# Patient Record
Sex: MALE | Race: WHITE | NOT HISPANIC OR LATINO | Employment: FULL TIME | URBAN - METROPOLITAN AREA
[De-identification: names, ages, dates, MRNs, and addresses within clinical notes are randomized per-mention and may not be internally consistent; named-entity substitution may affect disease eponyms.]

---

## 2023-11-09 ENCOUNTER — OFFICE VISIT (OUTPATIENT)
Dept: INTERNAL MEDICINE | Facility: CLINIC | Age: 38
End: 2023-11-09
Payer: COMMERCIAL

## 2023-11-09 VITALS
WEIGHT: 279 LBS | DIASTOLIC BLOOD PRESSURE: 76 MMHG | RESPIRATION RATE: 18 BRPM | TEMPERATURE: 96.6 F | OXYGEN SATURATION: 98 % | HEART RATE: 103 BPM | SYSTOLIC BLOOD PRESSURE: 124 MMHG | HEIGHT: 70 IN | BODY MASS INDEX: 39.94 KG/M2

## 2023-11-09 DIAGNOSIS — Z79.899 LONG TERM USE OF DRUG: ICD-10-CM

## 2023-11-09 DIAGNOSIS — R53.83 OTHER FATIGUE: ICD-10-CM

## 2023-11-09 DIAGNOSIS — Z23 IMMUNIZATION DUE: ICD-10-CM

## 2023-11-09 DIAGNOSIS — Z13.220 LIPID SCREENING: ICD-10-CM

## 2023-11-09 DIAGNOSIS — F90.0 ATTENTION DEFICIT HYPERACTIVITY DISORDER (ADHD), PREDOMINANTLY INATTENTIVE TYPE: Primary | ICD-10-CM

## 2023-11-09 DIAGNOSIS — J45.20 MILD INTERMITTENT ASTHMA, UNSPECIFIED WHETHER COMPLICATED: ICD-10-CM

## 2023-11-09 DIAGNOSIS — Z23 INFLUENZA VACCINE NEEDED: ICD-10-CM

## 2023-11-09 DIAGNOSIS — E55.9 VITAMIN D DEFICIENCY: ICD-10-CM

## 2023-11-09 DIAGNOSIS — R35.0 URINARY FREQUENCY: ICD-10-CM

## 2023-11-09 DIAGNOSIS — F33.8 OTHER RECURRENT DEPRESSIVE DISORDERS: ICD-10-CM

## 2023-11-09 DIAGNOSIS — F41.1 GAD (GENERALIZED ANXIETY DISORDER): ICD-10-CM

## 2023-11-09 DIAGNOSIS — R73.09 ELEVATED GLUCOSE: ICD-10-CM

## 2023-11-09 LAB
BILIRUB BLD-MCNC: NEGATIVE MG/DL
CLARITY, POC: CLEAR
COLOR UR: NORMAL
EXPIRATION DATE: NORMAL
GLUCOSE UR STRIP-MCNC: NEGATIVE MG/DL
KETONES UR QL: NEGATIVE
LEUKOCYTE EST, POC: NEGATIVE
Lab: NORMAL
NITRITE UR-MCNC: NEGATIVE MG/ML
PH UR: 7 [PH] (ref 5–8)
PROT UR STRIP-MCNC: NEGATIVE MG/DL
RBC # UR STRIP: NEGATIVE /UL
SP GR UR: 1.01 (ref 1–1.03)
UROBILINOGEN UR QL: NORMAL

## 2023-11-09 RX ORDER — ESOMEPRAZOLE MAGNESIUM 20 MG/1
20 FOR SUSPENSION ORAL
COMMUNITY

## 2023-11-09 RX ORDER — FLUTICASONE PROPIONATE 50 MCG
2 SPRAY, SUSPENSION (ML) NASAL DAILY
COMMUNITY

## 2023-11-09 RX ORDER — DEXTROAMPHETAMINE SACCHARATE, AMPHETAMINE ASPARTATE, DEXTROAMPHETAMINE SULFATE, AND AMPHETAMINE SULFATE 7.5; 7.5; 7.5; 7.5 MG/1; MG/1; MG/1; MG/1
60 TABLET ORAL 2 TIMES DAILY
COMMUNITY
End: 2023-11-09 | Stop reason: SDUPTHER

## 2023-11-09 RX ORDER — ESOMEPRAZOLE MAGNESIUM 20 MG/1
20 FOR SUSPENSION ORAL
Status: CANCELLED | OUTPATIENT
Start: 2023-11-09

## 2023-11-09 RX ORDER — MEMANTINE HYDROCHLORIDE 10 MG/1
10 TABLET ORAL DAILY
COMMUNITY
End: 2023-11-09 | Stop reason: SDUPTHER

## 2023-11-09 RX ORDER — MEMANTINE HYDROCHLORIDE 10 MG/1
10 TABLET ORAL DAILY
Qty: 30 TABLET | Refills: 5 | Status: SHIPPED | OUTPATIENT
Start: 2023-11-09

## 2023-11-09 RX ORDER — DEXTROAMPHETAMINE SACCHARATE, AMPHETAMINE ASPARTATE, DEXTROAMPHETAMINE SULFATE, AND AMPHETAMINE SULFATE 7.5; 7.5; 7.5; 7.5 MG/1; MG/1; MG/1; MG/1
60 TABLET ORAL 2 TIMES DAILY
Qty: 120 TABLET | Refills: 0 | Status: SHIPPED | OUTPATIENT
Start: 2023-11-09 | End: 2023-11-20 | Stop reason: SDUPTHER

## 2023-11-09 RX ORDER — FLUTICASONE PROPIONATE 50 MCG
2 SPRAY, SUSPENSION (ML) NASAL DAILY
Status: CANCELLED | OUTPATIENT
Start: 2023-11-09

## 2023-11-09 NOTE — PROGRESS NOTES
"    Office Note     Name: Bobo Bolden    : 1985     MRN: [id number]     Chief Complaint  Establish Care and ADHD (Needs medications refilled today )    Subjective     History of Present Illness:  Bobo Bolden is a 38 y.o. male who presents today for establishing care.  He states he has problems with ADHD and anxiety and depression and asthma and allergies and these are all stable with his current medication and he needs refills.  He states he was placed on Namenda as an off label use for ADHD and it does seem to help and this was by psychiatrist and he has done well with it.  He occasionally has urinary frequency and some other issues as well.  He would like to see a urologist.  He has not had labs in quite some time.  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    Review of Systems   Respiratory:  Negative for cough, shortness of breath and wheezing.    Cardiovascular:  Negative for chest pain and palpitations.   Gastrointestinal:  Negative for blood in stool, diarrhea and vomiting.   Genitourinary:  Negative for dysuria, flank pain and genital sores.       Objective     Vital Signs  /76   Pulse 103   Temp 96.6 °F (35.9 °C) (Infrared)   Resp 18   Ht 178.7 cm (70.35\")   Wt 127 kg (279 lb)   SpO2 98%   BMI 39.63 kg/m²   Estimated body mass index is 39.63 kg/m² as calculated from the following:    Height as of this encounter: 178.7 cm (70.35\").    Weight as of this encounter: 127 kg (279 lb).          Physical Exam  Vitals and nursing note reviewed.   HENT:      Head: Normocephalic and atraumatic.   Neck:      Vascular: No carotid bruit.   Cardiovascular:      Rate and Rhythm: Normal rate and regular rhythm.      Heart sounds: Normal heart sounds. No murmur heard.     No gallop.   Pulmonary:      Effort: Pulmonary effort is " normal. No respiratory distress.      Breath sounds: No stridor. No wheezing, rhonchi or rales.   Musculoskeletal:      Cervical back: Normal range of motion and neck supple.      Right lower leg: No edema.      Left lower leg: No edema.   Lymphadenopathy:      Cervical: No cervical adenopathy.   Neurological:      Mental Status: He is alert.                 Assessment and Plan     Diagnoses and all orders for this visit:    1. Attention deficit hyperactivity disorder (ADHD), predominantly inattentive type (Primary)  -     Adderall 30 MG tablet; Take 2 tablets by mouth 2 (Two) Times a Day.  Dispense: 120 tablet; Refill: 0  -     memantine (NAMENDA) 10 MG tablet; Take 1 tablet by mouth Daily.  Dispense: 30 tablet; Refill: 5  -     Compliance Drug Analysis, Ur - Urine, Clean Catch; Future    2. Influenza vaccine needed  -     Fluzone >6 Months ([id number])    3. Immunization due    4. Mild intermittent asthma, unspecified whether complicated  -     albuterol (PROAIR RESPICLICK) 108 (90 Base) MCG/ACT inhaler; Inhale 2 puffs Every 4 (Four) Hours As Needed for Wheezing.  Dispense: 1 each; Refill: 5    5. VLADIMIR (generalized anxiety disorder)  -     Effexor  MG 24 hr capsule; Take 1 capsule by mouth 2 (Two) Times a Day.  Dispense: 60 capsule; Refill: 4    6. Other recurrent depressive disorders  -     Effexor  MG 24 hr capsule; Take 1 capsule by mouth 2 (Two) Times a Day.  Dispense: 60 capsule; Refill: 4    7. Urinary frequency  -     POCT urinalysis dipstick, automated  -     Ambulatory Referral to Urology    8. Long term use of drug  -     Compliance Drug Analysis, Ur - Urine, Clean Catch; Future    9. Vitamin D deficiency  -     Vitamin D,25-Hydroxy; Future    10. Elevated glucose  -     Hemoglobin A1c; Future    11. Other fatigue  -     Comprehensive Metabolic Panel; Future  -     CBC Auto Differential; Future  -     TSH Rfx On Abnormal To Free T4; Future    12. Lipid screening  -     Lipid Panel;  Future          Follow Up  Return in about 3 months (around 2/9/2024) for Recheck.    Yohana Herman, DO

## 2023-11-10 ENCOUNTER — TELEPHONE (OUTPATIENT)
Dept: INTERNAL MEDICINE | Facility: CLINIC | Age: 38
End: 2023-11-10
Payer: COMMERCIAL

## 2023-11-10 DIAGNOSIS — F90.0 ATTENTION DEFICIT HYPERACTIVITY DISORDER (ADHD), PREDOMINANTLY INATTENTIVE TYPE: ICD-10-CM

## 2023-11-10 NOTE — LETTER
November 17, 2023    Bobo Kimi  [address] [facility] [address]      Mr. Bolden,       We have been trying to reach you regarding a prior authorization for your medication. Please call the office at your earliest convenience.                           Yohana Herman, DO

## 2023-11-10 NOTE — TELEPHONE ENCOUNTER
Pharmacy Name: [facility] - [address] - [phone] [facility] [phone]          Pharmacy representative phone number: [phone]    What medication are you calling in regards to: ADDERAL     What question does the pharmacy have: THE CALLER STATES THAT THE INSURANCE WILL NOT COVER THE NAME BRAND MEDICATION AND INSURANCE WILL NOT COVER THE 4 TABLETS A DAY FOR THE MEDICATION AS THE GENERIC THE CALLER STATES THAT THE PATIENT WILL NEED A PRIOR AUTHORIZATION     Who is the provider that prescribed the medication: DOCTOR MCCORD

## 2023-11-13 NOTE — TELEPHONE ENCOUNTER
"Received determination today:     Denied    Denied. Coverage is provided when the prescribed medication is used for a Food and Drug Administration (FDA) approved quantity and dose.     THE INFORMATION CONTAINED IN THE REMAINING PART OF THIS SECTION IS INTENDED FOR YOUR PROVIDER AND GIVES A MORE DETAILED DESCRIPTION FOR WHY THIS REQUEST WAS NOT APPROVED. Requests for Off-Label uses of a drug will be considered for approval according to the following criteria: I. Documentation must be submitted showing the member has tried and failed the existing FDA approved and/or clinical guideline recommended therapies unless contraindicated or not tolerated; AND II. The prescribed use must be supported by one or more of the following: a. Narrative information from [facility] Formulary Service Drug Information (AHFS) or Clinical Pharmacology b. Lexicomp: Evidence level A c. Micromedex: Recommendation class I, IIa, or IIb d. Evidence from at least two published studies from major scientific or medical peer reviewed journals demonstrates safety and efficacy for the specified condition in a comparable population (for example, age group or level of disease severity). Standards Used in Making the Decision include: Sections 6 and 7 in your Evidence of Coverage (\"Prescription Drugs\" and \"What is Not Covered\"), the [facility] Policy for Medical Necessity Off-Label, and Coverage Review Pharmacy Criteria and Policy.      Please advise.     "

## 2023-11-13 NOTE — TELEPHONE ENCOUNTER
Please let him know and ask if he would like a behavioral medicine referral to see if they can get it approved?

## 2023-11-16 LAB — DRUGS UR: NORMAL

## 2023-11-20 DIAGNOSIS — F90.0 ATTENTION DEFICIT HYPERACTIVITY DISORDER (ADHD), PREDOMINANTLY INATTENTIVE TYPE: ICD-10-CM

## 2023-11-20 RX ORDER — DEXTROAMPHETAMINE SACCHARATE, AMPHETAMINE ASPARTATE, DEXTROAMPHETAMINE SULFATE, AND AMPHETAMINE SULFATE 7.5; 7.5; 7.5; 7.5 MG/1; MG/1; MG/1; MG/1
60 TABLET ORAL 2 TIMES DAILY
Qty: 120 TABLET | Refills: 0 | Status: SHIPPED | OUTPATIENT
Start: 2023-11-20

## 2023-11-20 RX ORDER — DEXTROAMPHETAMINE SACCHARATE, AMPHETAMINE ASPARTATE, DEXTROAMPHETAMINE SULFATE, AND AMPHETAMINE SULFATE 7.5; 7.5; 7.5; 7.5 MG/1; MG/1; MG/1; MG/1
60 TABLET ORAL 2 TIMES DAILY
Qty: 120 TABLET | Refills: 0 | Status: CANCELLED | OUTPATIENT
Start: 2023-11-20

## 2023-11-20 NOTE — TELEPHONE ENCOUNTER
Called and spoke with patient, he is aware that insurance will not pay for adderall. He would like new prescription sent to [facility] [address] so he can use a GoodRx card and get this filled. He is also interested in a behavioral health referral at this time.

## 2023-11-28 ENCOUNTER — TELEPHONE (OUTPATIENT)
Dept: INTERNAL MEDICINE | Facility: CLINIC | Age: 38
End: 2023-11-28
Payer: COMMERCIAL

## 2023-11-28 DIAGNOSIS — F90.0 ATTENTION DEFICIT HYPERACTIVITY DISORDER (ADHD), PREDOMINANTLY INATTENTIVE TYPE: Primary | ICD-10-CM

## 2023-11-28 NOTE — TELEPHONE ENCOUNTER
Hub staff attempted to follow warm transfer process and was unsuccessful     Caller: Bobo Bolden    Relationship to patient: Self    Best call back number: [phone]    INSURANCE WILL NOT PAY FOR ADDERALL AT THIS DOSAGE; REQUESTING THE GENERIC MEDICATION TO BE CALLED TO     [facility] [place], [place] - [address] [facility] [address] - [phone]  - [phone] FX

## 2023-11-28 NOTE — TELEPHONE ENCOUNTER
Patient called and stated he is aware he will pay out of pocket for the dose needed. Patient is fine with Behavioral Health taking this over once he gets established. Patient is requesting RX for the generic of Adderal so that his out of pocket cost is a little cheaper

## 2023-11-28 NOTE — TELEPHONE ENCOUNTER
It is the dosage that is the problem , not the medication.--He has been given a behavioral health referral to see if they can get it covered

## 2023-11-29 DIAGNOSIS — F90.0 ATTENTION DEFICIT HYPERACTIVITY DISORDER (ADHD), PREDOMINANTLY INATTENTIVE TYPE: ICD-10-CM

## 2023-11-29 DIAGNOSIS — F90.0 ATTENTION DEFICIT HYPERACTIVITY DISORDER (ADHD), PREDOMINANTLY INATTENTIVE TYPE: Primary | ICD-10-CM

## 2023-11-29 RX ORDER — DEXTROAMPHETAMINE SACCHARATE, AMPHETAMINE ASPARTATE, DEXTROAMPHETAMINE SULFATE, AND AMPHETAMINE SULFATE 7.5; 7.5; 7.5; 7.5 MG/1; MG/1; MG/1; MG/1
60 TABLET ORAL 2 TIMES DAILY
Qty: 120 TABLET | Refills: 0 | Status: CANCELLED | OUTPATIENT
Start: 2023-11-29

## 2023-11-29 RX ORDER — DEXTROAMPHETAMINE SACCHARATE, AMPHETAMINE ASPARTATE, DEXTROAMPHETAMINE SULFATE AND AMPHETAMINE SULFATE 7.5; 7.5; 7.5; 7.5 MG/1; MG/1; MG/1; MG/1
60 TABLET ORAL 2 TIMES DAILY
Qty: 120 TABLET | Refills: 0 | Status: SHIPPED | OUTPATIENT
Start: 2023-11-29

## 2023-11-29 NOTE — TELEPHONE ENCOUNTER
Caller: ChristopherBobo chaudhari    Relationship: Self    Best call back number:      Requested Prescriptions:   Requested Prescriptions     Pending Prescriptions Disp Refills    Adderall 30 MG tablet 120 tablet 0     Sig: Take 2 tablets by mouth 2 (Two) Times a Day.        Pharmacy where request should be sent: [id number] [facility] [address] [facility] [address] - [phone]  - [phone] FX     Last office visit with prescribing clinician: 11/9/2023   Last telemedicine visit with prescribing clinician: Visit date not found   Next office visit with prescribing clinician: 2/7/2024     Additional details provided by patient: PATIENT IS REQUESTING GENERIC AS THE BRAND IS TOO EXPENSIVE; HE IS ALSO OUT OF MEDICATION    Does the patient have less than a 3 day supply:  [x] Yes  [] No    Dary Schultz Rep   11/29/23 08:06 EST

## 2023-11-29 NOTE — TELEPHONE ENCOUNTER
Called and notified patient of medication being sent to the pharmacy. He verbalized understanding and had no further questions.

## 2023-12-21 ENCOUNTER — OFFICE VISIT (OUTPATIENT)
Dept: UROLOGY | Facility: CLINIC | Age: 38
End: 2023-12-21
Payer: COMMERCIAL

## 2023-12-21 VITALS — HEART RATE: 97 BPM | WEIGHT: 279 LBS | BODY MASS INDEX: 39.94 KG/M2 | HEIGHT: 70 IN | OXYGEN SATURATION: 97 %

## 2023-12-21 DIAGNOSIS — R35.0 URINARY FREQUENCY: ICD-10-CM

## 2023-12-21 DIAGNOSIS — M62.89 PELVIC FLOOR DYSFUNCTION: ICD-10-CM

## 2023-12-21 DIAGNOSIS — N52.9 ERECTILE DYSFUNCTION, UNSPECIFIED ERECTILE DYSFUNCTION TYPE: Primary | ICD-10-CM

## 2023-12-21 LAB
BILIRUB BLD-MCNC: NEGATIVE MG/DL
CLARITY, POC: CLEAR
COLOR UR: YELLOW
EXPIRATION DATE: NORMAL
GLUCOSE UR STRIP-MCNC: NEGATIVE MG/DL
KETONES UR QL: NEGATIVE
LEUKOCYTE EST, POC: NEGATIVE
Lab: NORMAL
NITRITE UR-MCNC: NEGATIVE MG/ML
PH UR: 6.5 [PH] (ref 5–8)
PROT UR STRIP-MCNC: NEGATIVE MG/DL
RBC # UR STRIP: NEGATIVE /UL
SP GR UR: 1.01 (ref 1–1.03)
UROBILINOGEN UR QL: NORMAL

## 2023-12-21 RX ORDER — TADALAFIL 20 MG/1
20 TABLET ORAL DAILY PRN
Qty: 10 TABLET | Refills: 3 | Status: SHIPPED | OUTPATIENT
Start: 2023-12-21

## 2023-12-21 NOTE — PROGRESS NOTES
[facility] Male Office Visit      Patient Name: Bobo Bolden  : 1985   MRN: [id number]     Chief Complaint:  Lower Urinary Tract Symptoms.   Chief Complaint   Patient presents with    Urinary Frequency    Erectile Dysfunction        Referring Provider: Yohana Herman DO    History of Present Illness: Mr. Bolden is a 38 y.o. male with history of lower urinary tract symptoms. He reports urinary frequency that began about 7 years ago. He occasionally has to strain to initiate urinary stream and to fully empty his bladder. He denies urinary urgency, dysuria, gross hematuria or suprapubic pain.    He drinks 2 cups of coffee and 1 soda during the day. He takes metamucil as needed for constipation.     He reports difficulty obtaining and maintaining erections for the last 10 years. He reports worsening erectile dysfunction after he takes Adderall. He does not smoke cigarettes. He vapes. He is not diabetic.     UA negative today.   Subjective      Review of System: Review of Systems   Genitourinary:  Positive for frequency. Negative for dysuria, flank pain, hematuria and urgency.        Erectile dysfunction      I have reviewed the ROS documented by my clinical staff, I have updated appropriately and I agree. KETIH Long    Past Medical History:  Past Medical History:   Diagnosis Date    ADHD (attention deficit hyperactivity disorder)     Allergic     Anxiety     Asthma     Depression     GERD (gastroesophageal reflux disease)     Substance abuse        Past Surgical History:  Past Surgical History:   Procedure Laterality Date    ADENOIDECTOMY  2006    TONSILLECTOMY  2006       Medications:    Current Outpatient Medications:     albuterol (PROAIR RESPICLICK) 108 (90 Base) MCG/ACT inhaler, Inhale 2 puffs Every 4 (Four) Hours As Needed for Wheezing., Disp: 1 each, Rfl: 5    amphetamine-dextroamphetamine (ADDERALL) 30 MG tablet, Take 2 tablets by mouth 2 (Two) Times a Day., Disp: 120 tablet, Rfl: 0     Effexor  MG 24 hr capsule, Take 1 capsule by mouth 2 (Two) Times a Day., Disp: 60 capsule, Rfl: 4    esomeprazole (NexIUM) 20 MG packet, Take 20 mg by mouth Every Morning Before Breakfast., Disp: , Rfl:     fluticasone (FLONASE) 50 MCG/ACT nasal spray, 2 sprays into the nostril(s) as directed by provider Daily., Disp: , Rfl:     memantine (NAMENDA) 10 MG tablet, Take 1 tablet by mouth Daily. 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  2 - Less than half the time   2)  Frequency:       How often have you had the urinate again less than two hours after you finished urinating?  1 - Less than 1 time in 5   3)  Intermittency:       How often have you found you stopped and started again several times when you urinated?   0 - Not at all   4) Urgency:      How often have you found it difficult to postpone urination?  0 - Not at all   5) Weak  "Stream:      How often have you had a weak urinary stream?  3 - About half the time   6) Straining:       How often have you had to push or strain to begin urination?  1 - Less than 1 time in 5   7) Nocturia:      How many times did you most typically get up to urinate from the time you went to bed at night until the time you got up in the morning?  0 - None   Total Score:  9   The International Prostate Symptom Score (IPSS) is used to screen, diagnose, track symptoms of benign prostatic hyperplasia (BPH).   0-7 (Mild Symptoms) 8-19 (Moderate) 20-35 (Severe)   Quality of Life (QoL):  If you were to spend the rest of your life with your urinary condition just the way it is now, how would you feel about that? 2-Mostly Satisfied   Urine Leakage (Incontinence) 0-No Leakage       Sexual Health Inventory for Men (LONI)   Over the past 6 months:     1. How do you rate your confidence that you could get and keep an erection?  2 - Low   2. When you had erections with sexual  stimulation, how often were your erections hard enough for penetration (entering your partner)?  5 - Almost always or always    3. During sexual intercourse, how often were you able to maintain your erection after you had penetrated (entered) your partner?  5 - Almost always or always    4. During sexual intercourse, how difficult was it to maintain your erection to completion of intercourse?  2 - Very difficult    5. When you attempted sexual intercourse, how often was it satisfactory for you?  2 - A few times (much less than half the time)    Total Score: 16   The Sexual Health Inventory for Men further classifies ED severity with the following breakpoints:   1-7 (Severe ED) 8-11 (Moderate ED) 12-16 (Mild to Moderate ED) 17-21 (Mild ED)        Objective     Physical Exam:   Vital Signs:   Vitals:    12/21/23 1310   Pulse: 97   SpO2: 97%   Weight: 127 kg (279 lb)   Height: 178.7 cm (70.35\")   PainSc: 0-No pain     Body mass index is 39.63 kg/m². " "    Physical Exam  Vitals and nursing note reviewed.   Constitutional:       Appearance: Normal appearance.   HENT:      Head: Normocephalic and atraumatic.      Nose: Nose normal.      Mouth/Throat:      Mouth: Mucous membranes are moist.   Eyes:      Pupils: Pupils are equal, round, and reactive to light.   Pulmonary:      Effort: Pulmonary effort is normal.   Abdominal:      General: Abdomen is flat.      Palpations: Abdomen is soft.   Musculoskeletal:         General: Normal range of motion.      Cervical back: Normal range of motion.   Skin:     General: Skin is warm and dry.      Capillary Refill: Capillary refill takes less than 2 seconds.   Neurological:      General: No focal deficit present.      Mental Status: He is alert.   Psychiatric:         Mood and Affect: Mood normal.       Labs:   No results found for: \"PSA\"    Brief Urine Lab Results  (Last result in the past 365 days)        Color   Clarity   Blood   Leuk Est   Nitrite   Protein   CREAT   Urine HCG        11/09/23 1701 Dark Yellow   Clear   Negative   Negative   Negative   Negative                        No results found for: \"GLUCOSE\", \"CALCIUM\", \"NA\", \"K\", \"CO2\", \"CL\", \"BUN\", \"CREATININE\", \"EGFRIFAFRI\", \"EGFRIFNONA\", \"BCR\", \"ANIONGAP\"    No results found for: \"WBC\", \"HGB\", \"HCT\", \"MCV\", \"PLT\"    Images:   No Images in the past 120 days found..    Measures:   Tobacco:   Bobo Bolden  reports that he has never smoked. He has been exposed to tobacco smoke. He has never used smokeless tobacco..       Urine Incontinence: Patient reports that he is not currently experiencing any symptoms of urinary incontinence.     Assessment / Plan      Assessment:  Mr. Bolden is a 38 y.o. male who presented today with urinary frequency and erectile dysfunction.     We discussed to decrease caffeine intake. We discussed referral to PFPT for his urinary frequency. He is agreeable. If symptoms do not improve, we may consider Oxybutynin 10mg daily in the " future.     He will begin Cialis 20mg as needed for erectile dysfunction. We discussed to take 30-45 minutes prior to intercourse. Present to nearest ER if erections lasts >4 hours. We discussed his Adderall and Effexor likely worsens his erectile dysfunction.      Diagnoses and all orders for this visit:    1. Erectile dysfunction, unspecified erectile dysfunction type (Primary)  -     tadalafil (Cialis) 20 MG tablet; Take 1 tablet by mouth Daily As Needed for Erectile Dysfunction.  Dispense: 10 tablet; Refill: 3    2. Urinary frequency  -     Ambulatory Referral to Physical Therapy Evaluate and treat, Pelvic Floor    3. Pelvic floor dysfunction  -     Ambulatory Referral to Physical Therapy Evaluate and treat, Pelvic Floor       Follow Up:   Return for 8 weeks .    I spent approximately 30 minutes providing clinical care for this patient; including review of patient's chart and provider documentation, face to face time spent with patient in examination room (obtaining history, performing physical exam, discussing diagnosis and management options), placing orders, and completing patient documentation.     KEITH Long  [facility] [place]

## 2024-02-12 DIAGNOSIS — F90.0 ATTENTION DEFICIT HYPERACTIVITY DISORDER (ADHD), PREDOMINANTLY INATTENTIVE TYPE: ICD-10-CM

## 2024-02-12 RX ORDER — DEXTROAMPHETAMINE SACCHARATE, AMPHETAMINE ASPARTATE, DEXTROAMPHETAMINE SULFATE AND AMPHETAMINE SULFATE 7.5; 7.5; 7.5; 7.5 MG/1; MG/1; MG/1; MG/1
60 TABLET ORAL 2 TIMES DAILY
Qty: 120 TABLET | Refills: 0 | Status: SHIPPED | OUTPATIENT
Start: 2024-02-12

## 2024-02-25 DIAGNOSIS — F90.0 ATTENTION DEFICIT HYPERACTIVITY DISORDER (ADHD), PREDOMINANTLY INATTENTIVE TYPE: ICD-10-CM

## 2024-02-27 ENCOUNTER — TELEPHONE (OUTPATIENT)
Dept: INTERNAL MEDICINE | Facility: CLINIC | Age: 39
End: 2024-02-27
Payer: COMMERCIAL

## 2024-02-27 DIAGNOSIS — F90.0 ATTENTION DEFICIT HYPERACTIVITY DISORDER (ADHD), PREDOMINANTLY INATTENTIVE TYPE: ICD-10-CM

## 2024-02-27 RX ORDER — DEXTROAMPHETAMINE SACCHARATE, AMPHETAMINE ASPARTATE, DEXTROAMPHETAMINE SULFATE AND AMPHETAMINE SULFATE 7.5; 7.5; 7.5; 7.5 MG/1; MG/1; MG/1; MG/1
60 TABLET ORAL 2 TIMES DAILY
Qty: 120 TABLET | Refills: 0 | Status: SHIPPED | OUTPATIENT
Start: 2024-02-27

## 2024-02-27 RX ORDER — DEXTROAMPHETAMINE SACCHARATE, AMPHETAMINE ASPARTATE, DEXTROAMPHETAMINE SULFATE AND AMPHETAMINE SULFATE 7.5; 7.5; 7.5; 7.5 MG/1; MG/1; MG/1; MG/1
60 TABLET ORAL 2 TIMES DAILY
Qty: 120 TABLET | Refills: 0 | Status: SHIPPED | OUTPATIENT
Start: 2024-02-27 | End: 2024-02-27 | Stop reason: SDUPTHER

## 2024-02-27 NOTE — TELEPHONE ENCOUNTER
Caller: Bobo Bolden    Relationship: Self    Best call back number:      Requested Prescriptions:   amphetamine-dextroamphetamine (ADDERALL) 30 MG tablet        Pharmacy where request should be sent: [facility] [place], [place] - [facility] [address] - [phone]  - [phone] FX     Last office visit with prescribing clinician: 11/9/2023   Last telemedicine visit with prescribing clinician: Visit date not found   Next office visit with prescribing clinician: Visit date not found     Additional details provided by patient: PATIENT WILL BE OUT BY TOMORROW AND PHARMACY IS ASKING IF IT CAN BE SENT IN BY TODAY SO THEY CAN MAKE SURE THEY HAVE THIS IN STOCK; HIS ORIGINAL PHARMACY IS  NOT DOING ELECTRONIC PRESCRIPTIONS AT THE CURRENT TIME    Does the patient have less than a 3 day supply:  [x] Yes  [] No    Would you like a call back once the refill request has been completed: [x] Yes [] No    If the office needs to give you a call back, can they leave a voicemail: [x] Yes [] No    Dary Schultz Rep   02/27/24 14:58 EST

## 2024-02-28 NOTE — TELEPHONE ENCOUNTER
Prescription was sent yesterday 02/26/24, I spoke with pharmacy and let them know I faxed it over. They verbalized understanding and said they would get it filled.

## 2024-03-07 ENCOUNTER — OFFICE VISIT (OUTPATIENT)
Dept: UROLOGY | Facility: CLINIC | Age: 39
End: 2024-03-07
Payer: COMMERCIAL

## 2024-03-07 ENCOUNTER — OFFICE VISIT (OUTPATIENT)
Dept: INTERNAL MEDICINE | Facility: CLINIC | Age: 39
End: 2024-03-07
Payer: COMMERCIAL

## 2024-03-07 VITALS — WEIGHT: 290 LBS | HEART RATE: 112 BPM | HEIGHT: 70 IN | BODY MASS INDEX: 41.52 KG/M2 | OXYGEN SATURATION: 99 %

## 2024-03-07 VITALS
TEMPERATURE: 96.4 F | HEART RATE: 90 BPM | BODY MASS INDEX: 43.58 KG/M2 | WEIGHT: 304.4 LBS | DIASTOLIC BLOOD PRESSURE: 96 MMHG | SYSTOLIC BLOOD PRESSURE: 152 MMHG | OXYGEN SATURATION: 100 % | HEIGHT: 70 IN

## 2024-03-07 DIAGNOSIS — N52.9 ERECTILE DYSFUNCTION, UNSPECIFIED ERECTILE DYSFUNCTION TYPE: Primary | ICD-10-CM

## 2024-03-07 DIAGNOSIS — F41.1 GAD (GENERALIZED ANXIETY DISORDER): ICD-10-CM

## 2024-03-07 DIAGNOSIS — Z79.899 LONG TERM USE OF DRUG: ICD-10-CM

## 2024-03-07 DIAGNOSIS — Z00.01 ENCOUNTER FOR WELL ADULT EXAM WITH ABNORMAL FINDINGS: Primary | ICD-10-CM

## 2024-03-07 DIAGNOSIS — F90.0 ATTENTION DEFICIT HYPERACTIVITY DISORDER (ADHD), PREDOMINANTLY INATTENTIVE TYPE: Primary | ICD-10-CM

## 2024-03-07 DIAGNOSIS — R39.9 LOWER URINARY TRACT SYMPTOMS (LUTS): ICD-10-CM

## 2024-03-07 PROCEDURE — 99214 OFFICE O/P EST MOD 30 MIN: CPT | Performed by: STUDENT IN AN ORGANIZED HEALTH CARE EDUCATION/TRAINING PROGRAM

## 2024-03-07 RX ORDER — DEXTROAMPHETAMINE SACCHARATE, AMPHETAMINE ASPARTATE, DEXTROAMPHETAMINE SULFATE AND AMPHETAMINE SULFATE 7.5; 7.5; 7.5; 7.5 MG/1; MG/1; MG/1; MG/1
60 TABLET ORAL 2 TIMES DAILY
Qty: 120 TABLET | Refills: 0 | Status: SHIPPED | OUTPATIENT
Start: 2024-03-07

## 2024-03-07 RX ORDER — DEXTROAMPHETAMINE SACCHARATE, AMPHETAMINE ASPARTATE, DEXTROAMPHETAMINE SULFATE AND AMPHETAMINE SULFATE 7.5; 7.5; 7.5; 7.5 MG/1; MG/1; MG/1; MG/1
60 TABLET ORAL 2 TIMES DAILY
Qty: 120 TABLET | Refills: 0 | Status: SHIPPED | OUTPATIENT
Start: 2024-03-07 | End: 2024-03-07

## 2024-03-07 NOTE — PROGRESS NOTES
Follow Up Office Visit      Patient Name: Bobo Bolden  : 1985   MRN: [id number]     Chief Complaint:    Chief Complaint   Patient presents with    Erectile Dysfunction    Pelvic Floor Dysfunction     Urinary Frequency       Referring Provider: No ref. provider found    History of Present Illness: Bobo Bolden is a 38 y.o. male who presents today for follow up of lower urinary tract symptoms and erectile dysfunction.  Last seen in consultation with KEITH Gallardo on 2023.  Progressive issues with urinary frequency and occasional need to strain to void over 7 years, and about 10 years of difficulty obtaining and maintaining erections, especially after taking Adderall.  He was recommended to decrease his caffeine intake, start pelvic floor physical therapy, and start Cialis.    He was unable to start pelvic floor physical therapy, and has not yet decreased his caffeine intake.  He reports his lower urinary tract symptoms are otherwise stable.  He tells me is mostly urinary frequency and some urgency.  He denies any urge incontinence, significantly weak stream, straining to void, feelings of incomplete bladder emptying.  He denies any dysuria or gross hematuria.    States the Cialis is helpful in getting him to start erections though sometimes he is not able to maintain them long enough to get to completion.  He does continue to use Adderall and vape.  He also endorses some issues with fatigue/low energy, low motivation.  Denies any issues with low libido.    He also wishes to discuss concerns about fertility.  States he had a semen analysis that he ordered through [employer] performed a couple years ago.  States there was something abnormal about it but he does not remember specifics.  States that he and his wife have been actively trying to conceive since last October or so.    Has concerns about fertility.  Had semen analysis a while ago (~2yrs ago), something abnormal, but doesn't remember. Pt  and wife trying since last oct    IPSS:  9 -> 9  LONI:  16 -> 15    Subjective      Review of System: Review of Systems   Genitourinary:  Negative for decreased urine volume, difficulty urinating, dysuria, enuresis, flank pain, frequency, hematuria and urgency.      I have reviewed the ROS documented by my clinical staff, I have updated appropriately and I agree. Dominic Garcia PA-C    I have reviewed and the following portions of the patient's history were updated as appropriate: past family history, past medical history, past social history, past surgical history and problem list.    Medications:     Current Outpatient Medications:     albuterol (PROAIR RESPICLICK) 108 (90 Base) MCG/ACT inhaler, Inhale 2 puffs Every 4 (Four) Hours As Needed for Wheezing., Disp: 1 each, Rfl: 5    amphetamine-dextroamphetamine (ADDERALL) 30 MG tablet, Take 2 tablets by mouth 2 (Two) Times a Day., Disp: 120 tablet, Rfl: 0    Effexor  MG 24 hr capsule, Take 1 capsule by mouth 2 (Two) Times a Day., Disp: 60 capsule, Rfl: 4    esomeprazole (NexIUM) 20 MG packet, Take 20 mg by mouth Every Morning Before Breakfast., Disp: , Rfl:     fluticasone (FLONASE) 50 MCG/ACT nasal spray, 2 sprays into the nostril(s) as directed by provider Daily., Disp: , Rfl:     memantine (NAMENDA) 10 MG tablet, Take 1 tablet by mouth Daily., Disp: 30 tablet, Rfl: 5    tadalafil (Cialis) 20 MG tablet, Take 1 tablet by mouth Daily As Needed for Erectile Dysfunction., Disp: 10 tablet, Rfl: 3    Allergies:   No Known Allergies    IPSS Questionnaire (AUA-7):  Over the past month…    1)  Incomplete Emptying:       How often have you had a sensation of not emptying you had the sensation of not emptying your bladder completely after you finished urinating?  2 - Less than half the time   2)  Frequency:       How often have you had the urinate again less than two hours after you finished urinating?  2 - Less than half the time   3)  Intermittency:       How often  have you found you stopped and started again several times when you urinated?   1 - Less than 1 time in 5   4) Urgency:      How often have you found it difficult to postpone urination?  0 - Not at all   5) Weak Stream:      How often have you had a weak urinary stream?  1 - Less than 1 time in 5   6) Straining:       How often have you had to push or strain to begin urination?  2 - Less than half the time   7) Nocturia:      How many times did you most typically get up to urinate from the time you went to bed at night until the time you got up in the morning?  1 - 1 time   Total Score:  9   The International Prostate Symptom Score (IPSS) is used to screen, diagnose, track symptoms of benign prostatic hyperplasia (BPH).   0-7 (Mild Symptoms) 8-19 (Moderate) 20-35 (Severe)   Quality of Life (QoL):  If you were to spend the rest of your life with your urinary condition just the way it is now, how would you feel about that? 1-Pleased   Urine Leakage (Incontinence) 0-No Leakage     Sexual Health Inventory for Men (LONI)   Over the past 6 months:     1. How do you rate your confidence that you could get and keep an erection?  3 - Moderate   2. When you had erections with sexual  stimulation, how often were your erections hard enough for penetration (entering your partner)?  4 - Most times ( much more than, half the time)   3. During sexual intercourse, how often were you able to maintain your erection after you had penetrated (entered) your partner?  4 - Most times ( much more than, half the time)   4. During sexual intercourse, how difficult was it to maintain your erection to completion of intercourse?  1 - Extremely difficult   5. When you attempted sexual intercourse, how often was it satisfactory for you?  3 - Sometimes (About half the time)     Total Score: 15   The Sexual Health Inventory for Men further classifies ED severity with the following breakpoints:   1-7 (Severe ED) 8-11 (Moderate ED) 12-16 (Mild to  "Moderate ED) 17-21 (Mild ED)          Objective     Physical Exam:   Vital Signs:   Vitals:    03/07/24 1019   Pulse: 112   SpO2: 99%   Weight: 132 kg (290 lb)   Height: 178.7 cm (70.35\")   PainSc: 0-No pain     Body mass index is 41.2 kg/m².     Physical Exam  Vitals and nursing note reviewed.   Constitutional:       Appearance: Normal appearance.   HENT:      Head: Normocephalic and atraumatic.      Mouth/Throat:      Mouth: Mucous membranes are moist.      Pharynx: Oropharynx is clear.   Eyes:      Extraocular Movements: Extraocular movements intact.      Conjunctiva/sclera: Conjunctivae normal.   Cardiovascular:      Rate and Rhythm: Normal rate and regular rhythm.   Pulmonary:      Effort: Pulmonary effort is normal. No respiratory distress.   Abdominal:      Palpations: Abdomen is soft.      Tenderness: There is no abdominal tenderness. There is no right CVA tenderness or left CVA tenderness.   Musculoskeletal:         General: Normal range of motion.      Cervical back: Normal range of motion.   Skin:     General: Skin is warm and dry.   Neurological:      General: No focal deficit present.      Mental Status: He is alert and oriented to person, place, and time.   Psychiatric:         Mood and Affect: Mood normal.         Behavior: Behavior normal.         Labs:   Brief Urine Lab Results  (Last result in the past 365 days)        Color   Clarity   Blood   Leuk Est   Nitrite   Protein   CREAT   Urine HCG        12/21/23 1339 Yellow   Clear   Negative   Negative   Negative   Negative                        No results found for: \"GLUCOSE\", \"CALCIUM\", \"NA\", \"K\", \"CO2\", \"CL\", \"BUN\", \"CREATININE\", \"EGFRIFAFRI\", \"EGFRIFNONA\", \"BCR\", \"ANIONGAP\"    No results found for: \"WBC\", \"HGB\", \"HCT\", \"MCV\", \"PLT\"    Images:   No Images in the past 120 days found..    Measures:   Tobacco:   Bobo Bolden  reports that he has never smoked. He has been exposed to tobacco smoke. He has never used smokeless tobacco..  Patient " does endorse vaping.  I have educated him on the risk of diseases from using tobacco products such as cancer, COPD, and heart disease.     I advised him to quit and he is not willing to quit.    Urine Incontinence: ( NOUI)  Patient reports that is not currently experiencing any symptoms of urinary incontinence.    Assessment / Plan      Assessment/Plan:   38 y.o. male who presented today for follow up of lower urinary tract symptoms, erectile dysfunction, concerns about infertility.  Stable, mild lower urinary tract symptoms.  Predominant symptoms of urinary frequency and urgency.  No urge incontinence or nocturia.  Denies any significant weak stream or needing to strain to void.  He does continue to drink several caffeinated beverages a day.  He did not do pelvic floor therapy.  We discussed continued conservative strategies with reduction in caffeinated beverages, timed voids, double voiding.  He will let me know if he wants to try formal pelvic floor therapy.  Erectile dysfunction improved on Cialis though not always able to maintain erections long enough to get to completion.  He does have some other symptoms of possible low testosterone including low energy/motivation, and obesity.  Will check total testosterone, CBC, prolactin, LH.  If he has a total testosterone less than 300, we will repeat this.  Did instruct the patient to get these labs drawn before 10 AM.  Regarding his concerns about infertility, did discuss with patient that we would wait 1 year before further workup.  In the event that he does have low testosterone, we did discuss that Clomid might be a good fit for him particular with his concerns about infertility and report of some type of abnormal semen analysis a couple of years ago that he got off of [employer].  Will tentatively follow-up for 6 months, unless we confirm low testosterone with him, in which case I will bring him back sooner to discuss options, likely Clomid.  Knows to call the office  the meantime with any questions or concerns.  Is happy with this plan.    Diagnoses and all orders for this visit:    1. Erectile dysfunction, unspecified erectile dysfunction type (Primary)  -     Testosterone; Future  -     CBC (No Diff); Future  -     Prolactin; Future  -     Luteinizing Hormone; Future    2. Lower urinary tract symptoms (LUTS)         Follow Up:   Return in about 6 months (around 9/7/2024).    I spent approximately 30 minutes providing clinical care for this patient; including review of patient's chart and provider documentation, face to face time spent with patient in examination room (obtaining history, performing physical exam, discussing diagnosis and management options), placing orders, and completing patient documentation.     Dominic Garcia PA-C  [facility]

## 2024-03-07 NOTE — PROGRESS NOTES
Office Note     Name: Bobo Bolden    : 1985     MRN: [id number]     Chief Complaint  ADHD    Subjective     History of Present Illness:  Bobo Bolden is a 38 y.o. male who presents today for     Was previously seen by Dr. Virgil MD  Follow up for ADHD med management,   Taking Adderall 60mg BID. He has been on this dose for period of time  he also did not complete  blood work  He has generalized anxiety and takes Effexor, his anxiety is controlled well.      Review of Systems:   Review of Systems   All other systems reviewed and are negative.      Past Medical History:   Past Medical History:   Diagnosis Date    ADHD (attention deficit hyperactivity disorder)     Allergic     Anxiety     Asthma     Depression     GERD (gastroesophageal reflux disease)     Substance abuse        Past Surgical History:   Past Surgical History:   Procedure Laterality Date    ADENOIDECTOMY  2006    TONSILLECTOMY  2006       Family History:   Family History   Problem Relation Age of Onset    Hyperlipidemia Mother     Miscarriages / Stillbirths Mother     Early death Father     Hyperlipidemia Father     Alcohol abuse Maternal Grandfather     Asthma Maternal Uncle        Social History:   Social History     Socioeconomic History    Marital status:    Tobacco Use    Smoking status: Never     Passive exposure: Past    Smokeless tobacco: Never   Vaping Use    Vaping status: Every Day   Substance and Sexual Activity    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 Currently     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     Sexual activity: Yes     Partners: Female       Immunizations:   Immunization History   Administered Date(s) Administered    COVID-19 (PFIZER) Purple Cap Monovalent 2021, 04/10/2021    Covid-19 (Pfizer) Gray Cap Monovalent 2022    Fluzone (or Fluarix & Flulaval for VFC) >6mos 2023    Hep A, Unspecified 2019    Influenza Quad Vaccine (Inpatient)  "11/27/2016    Influenza, Unspecified 10/25/2017, 02/12/2019    PPD Test 08/24/2016, 08/31/2016, 10/25/2017, 02/12/2019    Tdap 08/24/2016        Medications:     Current Outpatient Medications:     albuterol (PROAIR RESPICLICK) 108 (90 Base) MCG/ACT inhaler, Inhale 2 puffs Every 4 (Four) Hours As Needed for Wheezing., Disp: 1 each, Rfl: 5    amphetamine-dextroamphetamine (ADDERALL) 30 MG tablet, Take 2 tablets by mouth 2 (Two) Times a Day., Disp: 120 tablet, Rfl: 0    Effexor  MG 24 hr capsule, Take 1 capsule by mouth 2 (Two) Times a Day., Disp: 60 capsule, Rfl: 4    esomeprazole (NexIUM) 20 MG packet, Take 20 mg by mouth Every Morning Before Breakfast., Disp: , Rfl:     fluticasone (FLONASE) 50 MCG/ACT nasal spray, 2 sprays into the nostril(s) as directed by provider Daily., Disp: , Rfl:     memantine (NAMENDA) 10 MG tablet, Take 1 tablet by mouth Daily., Disp: 30 tablet, Rfl: 5    tadalafil (Cialis) 20 MG tablet, Take 1 tablet by mouth Daily As Needed for Erectile Dysfunction., Disp: 10 tablet, Rfl: 3    Allergies:   No Known Allergies    Objective     Vital Signs  /96   Pulse 90   Temp 96.4 °F (35.8 °C) (Temporal)   Ht 178.7 cm (70.35\")   Wt (!) 138 kg (304 lb 6.4 oz)   SpO2 100%   BMI 43.24 kg/m²   Estimated body mass index is 43.24 kg/m² as calculated from the following:    Height as of this encounter: 178.7 cm (70.35\").    Weight as of this encounter: 138 kg (304 lb 6.4 oz).            Physical Exam  Constitutional:       Appearance: Normal appearance. He is obese.   Cardiovascular:      Rate and Rhythm: Normal rate and regular rhythm.      Pulses: Normal pulses.      Heart sounds: Normal heart sounds.   Pulmonary:      Effort: Pulmonary effort is normal.      Breath sounds: Normal breath sounds.   Neurological:      Mental Status: He is alert.          Result Review :                  Assessment and Plan     1. Attention deficit hyperactivity disorder (ADHD), predominantly inattentive " type  Controlled  Med refilled, ross reviewed UDS today  - amphetamine-dextroamphetamine (ADDERALL) 30 MG tablet; Take 2 tablets by mouth 2 (Two) Times a Day.  Dispense: 120 tablet; Refill: 0  - Compliance Drug Analysis, Ur - Urine, Clean Catch; Future  - Compliance Drug Analysis, Ur - Urine, Clean Catch    2. VLADIMIR (generalized anxiety disorder)  Controlled  Continue with effexor  - Compliance Drug Analysis, Ur - Urine, Clean Catch; Future  - Compliance Drug Analysis, Ur - Urine, Clean Catch    3. Long term use of drug    - Compliance Drug Analysis, Ur - Urine, Clean Catch; Future  - Compliance Drug Analysis, Ur - Urine, Clean Catch       Follow Up  No follow-ups on file.    Fernando Blanton MD  [facility]  [facility]  [address]  [address] [address] [phone]

## 2024-03-15 LAB — DRUGS UR: NORMAL

## 2024-04-01 ENCOUNTER — PRIOR AUTHORIZATION (OUTPATIENT)
Dept: INTERNAL MEDICINE | Facility: CLINIC | Age: 39
End: 2024-04-01
Payer: COMMERCIAL

## 2024-04-01 NOTE — TELEPHONE ENCOUNTER
Insurance has denied the adderall again.  In previous office note said he was going to be referred to behavioral health.  Pharmacy has been notified.  Patient notified by jakob.

## 2024-04-22 DIAGNOSIS — F90.0 ATTENTION DEFICIT HYPERACTIVITY DISORDER (ADHD), PREDOMINANTLY INATTENTIVE TYPE: ICD-10-CM

## 2024-04-23 RX ORDER — DEXTROAMPHETAMINE SACCHARATE, AMPHETAMINE ASPARTATE, DEXTROAMPHETAMINE SULFATE AND AMPHETAMINE SULFATE 7.5; 7.5; 7.5; 7.5 MG/1; MG/1; MG/1; MG/1
60 TABLET ORAL 2 TIMES DAILY
Qty: 120 TABLET | Refills: 0 | OUTPATIENT
Start: 2024-04-23

## 2024-04-24 DIAGNOSIS — F90.0 ATTENTION DEFICIT HYPERACTIVITY DISORDER (ADHD), PREDOMINANTLY INATTENTIVE TYPE: ICD-10-CM

## 2024-04-24 RX ORDER — DEXTROAMPHETAMINE SACCHARATE, AMPHETAMINE ASPARTATE, DEXTROAMPHETAMINE SULFATE AND AMPHETAMINE SULFATE 7.5; 7.5; 7.5; 7.5 MG/1; MG/1; MG/1; MG/1
60 TABLET ORAL 2 TIMES DAILY
Qty: 120 TABLET | Refills: 0 | Status: SHIPPED | OUTPATIENT
Start: 2024-04-24

## 2024-04-24 NOTE — TELEPHONE ENCOUNTER
Caller: Bobo Bolden    Relationship: Self    Best call back number: [phone]     Requested Prescriptions:   Requested Prescriptions     Pending Prescriptions Disp Refills    amphetamine-dextroamphetamine (ADDERALL) 30 MG tablet 120 tablet 0     Sig: Take 2 tablets by mouth 2 (Two) Times a Day.      Pharmacy where request should be sent: [facility] [place], [place] - [address] [place] [address] LN - [phone]  - [phone] FX     Last office visit with prescribing clinician: 3/7/2024   Last telemedicine visit with prescribing clinician: Visit date not found   Next office visit with prescribing clinician: Visit date not found     Additional details provided by patient: THE PATIENT WILL BE OUT OF THE MEDICATION IN 2 DAYS.     THE PATIENT HAD THIS REQUESTED DENIED FOR REFILL WITH THE REASON OF BEING REQUESTED TOO SOON. THE PATIENT HAD A QUANTITY  AD TAKEN 4 PILLS (2 PILLS TWO TIMES DAILY) THAT WOULD ONLY LAST HIM 30 DAYS. HE IS NOT SURE WHY THIS IS CONSIDERED TOO EARLY. IF IT HAS TO DO WITH THE LAB WORK NEEDED HE IS TRYING TO WORK OUT HIS SCHEDULE TO DO THIS TEST. PLEASE LET HIM KNOW IF THERE ARE ANY ISSUES OR CONCERNS.     Does the patient have less than a 3 day supply:  [x] Yes  [] No    Would you like a call back once the refill request has been completed: [x] Yes [] No    If the office needs to give you a call back, can they leave a voicemail: [] Yes [x] No    Dary Manrique Rep   04/24/24 13:12 EDT

## 2024-05-17 ENCOUNTER — LAB (OUTPATIENT)
Dept: LAB | Facility: HOSPITAL | Age: 39
End: 2024-05-17
Payer: COMMERCIAL

## 2024-05-17 DIAGNOSIS — N52.9 ERECTILE DYSFUNCTION, UNSPECIFIED ERECTILE DYSFUNCTION TYPE: ICD-10-CM

## 2024-05-17 DIAGNOSIS — Z00.01 ENCOUNTER FOR WELL ADULT EXAM WITH ABNORMAL FINDINGS: ICD-10-CM

## 2024-05-17 LAB
ALBUMIN SERPL-MCNC: 4.6 G/DL (ref 3.5–5.2)
ALBUMIN/GLOB SERPL: 1.8 G/DL
ALP SERPL-CCNC: 58 U/L (ref 39–117)
ALT SERPL W P-5'-P-CCNC: 36 U/L (ref 1–41)
ANION GAP SERPL CALCULATED.3IONS-SCNC: 10.1 MMOL/L (ref 5–15)
AST SERPL-CCNC: 26 U/L (ref 1–40)
BILIRUB SERPL-MCNC: 0.4 MG/DL (ref 0–1.2)
BUN SERPL-MCNC: 8 MG/DL (ref 6–20)
BUN/CREAT SERPL: 8.6 (ref 7–25)
CALCIUM SPEC-SCNC: 9.7 MG/DL (ref 8.6–10.5)
CHLORIDE SERPL-SCNC: 101 MMOL/L (ref 98–107)
CHOLEST SERPL-MCNC: 195 MG/DL (ref 0–200)
CO2 SERPL-SCNC: 27.9 MMOL/L (ref 22–29)
CREAT SERPL-MCNC: 0.93 MG/DL (ref 0.76–1.27)
DEPRECATED RDW RBC AUTO: 38.9 FL (ref 37–54)
EGFRCR SERPLBLD CKD-EPI 2021: 109.1 ML/MIN/1.73
ERYTHROCYTE [DISTWIDTH] IN BLOOD BY AUTOMATED COUNT: 13.3 % (ref 12.3–15.4)
GLOBULIN UR ELPH-MCNC: 2.6 GM/DL
GLUCOSE SERPL-MCNC: 92 MG/DL (ref 65–99)
HBA1C MFR BLD: 6.2 % (ref 4.8–5.6)
HCT VFR BLD AUTO: 43.6 % (ref 37.5–51)
HDLC SERPL-MCNC: 37 MG/DL (ref 40–60)
HGB BLD-MCNC: 14.3 G/DL (ref 13–17.7)
LDLC SERPL CALC-MCNC: 115 MG/DL (ref 0–100)
LDLC/HDLC SERPL: 2.94 {RATIO}
LH SERPL-ACNC: 4.01 MIU/ML
MCH RBC QN AUTO: 26.7 PG (ref 26.6–33)
MCHC RBC AUTO-ENTMCNC: 32.8 G/DL (ref 31.5–35.7)
MCV RBC AUTO: 81.5 FL (ref 79–97)
PLATELET # BLD AUTO: 233 10*3/MM3 (ref 140–450)
PMV BLD AUTO: 11.5 FL (ref 6–12)
POTASSIUM SERPL-SCNC: 4.1 MMOL/L (ref 3.5–5.2)
PROLACTIN SERPL-MCNC: 6.63 NG/ML (ref 4.04–15.2)
PROT SERPL-MCNC: 7.2 G/DL (ref 6–8.5)
RBC # BLD AUTO: 5.35 10*6/MM3 (ref 4.14–5.8)
SODIUM SERPL-SCNC: 139 MMOL/L (ref 136–145)
T4 FREE SERPL-MCNC: 1.08 NG/DL (ref 0.93–1.7)
TESTOST SERPL-MCNC: 319 NG/DL (ref 249–836)
TRIGL SERPL-MCNC: 246 MG/DL (ref 0–150)
TSH SERPL DL<=0.05 MIU/L-ACNC: 4.22 UIU/ML (ref 0.27–4.2)
VLDLC SERPL-MCNC: 43 MG/DL (ref 5–40)
WBC NRBC COR # BLD AUTO: 6.52 10*3/MM3 (ref 3.4–10.8)

## 2024-05-17 PROCEDURE — 80053 COMPREHEN METABOLIC PANEL: CPT

## 2024-05-17 PROCEDURE — 84403 ASSAY OF TOTAL TESTOSTERONE: CPT

## 2024-05-17 PROCEDURE — 84439 ASSAY OF FREE THYROXINE: CPT

## 2024-05-17 PROCEDURE — 84146 ASSAY OF PROLACTIN: CPT

## 2024-05-17 PROCEDURE — 83002 ASSAY OF GONADOTROPIN (LH): CPT

## 2024-05-17 PROCEDURE — 85027 COMPLETE CBC AUTOMATED: CPT

## 2024-05-17 PROCEDURE — 83036 HEMOGLOBIN GLYCOSYLATED A1C: CPT

## 2024-05-17 PROCEDURE — 84443 ASSAY THYROID STIM HORMONE: CPT

## 2024-05-17 PROCEDURE — 36415 COLL VENOUS BLD VENIPUNCTURE: CPT

## 2024-05-17 PROCEDURE — 80061 LIPID PANEL: CPT

## 2024-05-23 DIAGNOSIS — N52.9 ERECTILE DYSFUNCTION, UNSPECIFIED ERECTILE DYSFUNCTION TYPE: Primary | ICD-10-CM

## 2024-05-26 DIAGNOSIS — F90.0 ATTENTION DEFICIT HYPERACTIVITY DISORDER (ADHD), PREDOMINANTLY INATTENTIVE TYPE: ICD-10-CM

## 2024-05-26 DIAGNOSIS — N52.9 ERECTILE DYSFUNCTION, UNSPECIFIED ERECTILE DYSFUNCTION TYPE: ICD-10-CM

## 2024-05-26 DIAGNOSIS — F41.1 GAD (GENERALIZED ANXIETY DISORDER): ICD-10-CM

## 2024-05-26 DIAGNOSIS — J45.20 MILD INTERMITTENT ASTHMA, UNSPECIFIED WHETHER COMPLICATED: ICD-10-CM

## 2024-05-26 DIAGNOSIS — F33.8 OTHER RECURRENT DEPRESSIVE DISORDERS: ICD-10-CM

## 2024-05-28 DIAGNOSIS — F90.0 ATTENTION DEFICIT HYPERACTIVITY DISORDER (ADHD), PREDOMINANTLY INATTENTIVE TYPE: ICD-10-CM

## 2024-05-28 DIAGNOSIS — J45.20 MILD INTERMITTENT ASTHMA, UNSPECIFIED WHETHER COMPLICATED: ICD-10-CM

## 2024-05-28 RX ORDER — TADALAFIL 20 MG/1
20 TABLET ORAL DAILY PRN
Qty: 10 TABLET | Refills: 3 | Status: SHIPPED | OUTPATIENT
Start: 2024-05-28

## 2024-05-28 NOTE — TELEPHONE ENCOUNTER
REFILL REQUEST ALBUTEROL   LAST REFILL 11/9/23    EFFEXOR   LAST REFILL 11/9/23    MEMANTINE   LAST REFILL 11/9/23  LAST VISIT 3/7/24

## 2024-05-28 NOTE — TELEPHONE ENCOUNTER
Caller: Bobo Bolden    Relationship: Self    Best call back number: [phone]     Requested Prescriptions:   Requested Prescriptions     Pending Prescriptions Disp Refills    albuterol (PROAIR RESPICLICK) 108 (90 Base) MCG/ACT inhaler 1 each 5     Sig: Inhale 2 puffs Every 4 (Four) Hours As Needed for Wheezing.    amphetamine-dextroamphetamine (ADDERALL) 30 MG tablet 120 tablet 0     Sig: Take 2 tablets by mouth 2 (Two) Times a Day.        Pharmacy where request should be sent: [address] - [phone]  - [phone] FX     Last office visit with prescribing clinician: 3/7/2024   Last telemedicine visit with prescribing clinician: Visit date not found   Next office visit with prescribing clinician: Visit date not found     Additional details provided by patient: THIS IS THE PATIENT'S SECOND REQUEST FOR THESE REFILLS.    Does the patient have less than a 3 day supply:  [x] Yes  [] No    Would you like a call back once the refill request has been completed: [x] Yes [] No    If the office needs to give you a call back, can they leave a voicemail: [x] Yes [] No    Dary Ramos Rep   05/28/24 11:42 EDT

## 2024-05-28 NOTE — TELEPHONE ENCOUNTER
Rx Refill Note  Requested Prescriptions     Pending Prescriptions Disp Refills    tadalafil (Cialis) 20 MG tablet 10 tablet 3     Sig: Take 1 tablet by mouth Daily As Needed for Erectile Dysfunction.      Last office visit with prescribing clinician: 12/21/2023   Last telemedicine visit with prescribing clinician: Visit date not found   Next office visit with prescribing clinician: Visit date not found     Celia Hightower MA  05/28/24, 08:09 EDT

## 2024-05-29 ENCOUNTER — TELEPHONE (OUTPATIENT)
Dept: INTERNAL MEDICINE | Facility: CLINIC | Age: 39
End: 2024-05-29
Payer: COMMERCIAL

## 2024-05-29 ENCOUNTER — LAB (OUTPATIENT)
Dept: LAB | Facility: HOSPITAL | Age: 39
End: 2024-05-29
Payer: COMMERCIAL

## 2024-05-29 DIAGNOSIS — J45.20 MILD INTERMITTENT ASTHMA, UNSPECIFIED WHETHER COMPLICATED: ICD-10-CM

## 2024-05-29 DIAGNOSIS — N52.9 ERECTILE DYSFUNCTION, UNSPECIFIED ERECTILE DYSFUNCTION TYPE: ICD-10-CM

## 2024-05-29 LAB — TESTOST SERPL-MCNC: 337 NG/DL (ref 249–836)

## 2024-05-29 PROCEDURE — 84403 ASSAY OF TOTAL TESTOSTERONE: CPT

## 2024-05-29 PROCEDURE — 36415 COLL VENOUS BLD VENIPUNCTURE: CPT

## 2024-05-29 RX ORDER — MEMANTINE HYDROCHLORIDE 10 MG/1
10 TABLET ORAL DAILY
Qty: 30 TABLET | Refills: 5 | Status: SHIPPED | OUTPATIENT
Start: 2024-05-29

## 2024-05-29 RX ORDER — DEXTROAMPHETAMINE SACCHARATE, AMPHETAMINE ASPARTATE, DEXTROAMPHETAMINE SULFATE AND AMPHETAMINE SULFATE 7.5; 7.5; 7.5; 7.5 MG/1; MG/1; MG/1; MG/1
60 TABLET ORAL 2 TIMES DAILY
Qty: 120 TABLET | Refills: 0 | Status: SHIPPED | OUTPATIENT
Start: 2024-05-29

## 2024-05-29 RX ORDER — DEXTROAMPHETAMINE SACCHARATE, AMPHETAMINE ASPARTATE, DEXTROAMPHETAMINE SULFATE AND AMPHETAMINE SULFATE 7.5; 7.5; 7.5; 7.5 MG/1; MG/1; MG/1; MG/1
60 TABLET ORAL 2 TIMES DAILY
Qty: 120 TABLET | Refills: 0 | OUTPATIENT
Start: 2024-05-29

## 2024-05-29 NOTE — TELEPHONE ENCOUNTER
REFILL REQUEST ALBUTEROL   LAST REFILL 11/9/23    DUPLICATE REFILL REQUEST AMPHETAMINE -DEXTROAMPHETAMINE

## 2024-05-29 NOTE — TELEPHONE ENCOUNTER
Caller: Bobo Bolden    Relationship: Self    Best call back number: [phone]    What orders are you requesting (i.e. lab or imaging): URINALYSIS FOR MEDICATION CHECK    In what timeframe would the patient need to come in: ASAP/ IS AT LAB NOW    Where will you receive your lab/imaging services: [facility]    Additional notes:

## 2024-05-31 ENCOUNTER — TELEPHONE (OUTPATIENT)
Dept: INTERNAL MEDICINE | Facility: CLINIC | Age: 39
End: 2024-05-31
Payer: COMMERCIAL

## 2024-05-31 NOTE — TELEPHONE ENCOUNTER
Spoke with pharmacy and they are changing the albuterol inhaler to one the insurance will cover.  Patient has been notified.

## 2024-05-31 NOTE — TELEPHONE ENCOUNTER
Caller: Bobo Bolden    Relationship: Self    Best call back number: [phone]     What is the best time to reach you: ANYTIME     Who are you requesting to speak with (clinical staff, provider,  specific staff member): CLINICAL STAFF    What was the call regarding: PATIENT STATES THAT THE INHALER THAT WE HAVE ON FILE IS NOT WHAT HE IS NEEDING. HE SAYS THAT HE IS NEEDING THE ALBUTEROL INHALER THAT CAN BE PRESSED DOWN AND NOT THE DRY POWDER KIND THAT HAS TO BE CLICKED OVER LIKE THE ADVAIR DISKUS. HE IS HOPING TO HAVE THE MEDICATION CHANGE BE SENT TO HIS PHARMACY.     Is it okay if the provider responds through [employer]: YES

## 2024-05-31 NOTE — TELEPHONE ENCOUNTER
Rx Refill Note  Requested Prescriptions     Pending Prescriptions Disp Refills    albuterol (PROAIR RESPICLICK) 108 (90 Base) MCG/ACT inhaler 1 each 5     Sig: Inhale 2 puffs Every 4 (Four) Hours As Needed for Wheezing.      Last office visit with prescribing clinician: 3/7/2024   Last telemedicine visit with prescribing clinician: Visit date not found   Next office visit with prescribing clinician: Visit date not found       Rani Hickey MA  05/31/24, 08:26 EDT

## 2024-06-25 DIAGNOSIS — F90.0 ATTENTION DEFICIT HYPERACTIVITY DISORDER (ADHD), PREDOMINANTLY INATTENTIVE TYPE: ICD-10-CM

## 2024-06-26 NOTE — TELEPHONE ENCOUNTER
Last Appt: 3/7/2024  Next Appt: no future appt     UDS: 3/7/2024  CSA: NONE ON FILE     Medication: Adderall   Last refill: 5/29/2024  # of refills: 120 / 0    NO FOLLOW UP IN CHART NOTES    Pharmacy:   [id number] - [place] - [address] [phone] [facility] [phone]

## 2024-06-27 DIAGNOSIS — F90.0 ATTENTION DEFICIT HYPERACTIVITY DISORDER (ADHD), PREDOMINANTLY INATTENTIVE TYPE: ICD-10-CM

## 2024-06-27 RX ORDER — DEXTROAMPHETAMINE SACCHARATE, AMPHETAMINE ASPARTATE, DEXTROAMPHETAMINE SULFATE AND AMPHETAMINE SULFATE 7.5; 7.5; 7.5; 7.5 MG/1; MG/1; MG/1; MG/1
60 TABLET ORAL 2 TIMES DAILY
Qty: 120 TABLET | Refills: 0 | Status: SHIPPED | OUTPATIENT
Start: 2024-06-27 | End: 2024-06-28 | Stop reason: SDUPTHER

## 2024-06-28 ENCOUNTER — TELEPHONE (OUTPATIENT)
Dept: INTERNAL MEDICINE | Facility: CLINIC | Age: 39
End: 2024-06-28
Payer: COMMERCIAL

## 2024-06-28 DIAGNOSIS — F90.0 ATTENTION DEFICIT HYPERACTIVITY DISORDER (ADHD), PREDOMINANTLY INATTENTIVE TYPE: ICD-10-CM

## 2024-06-28 RX ORDER — DEXTROAMPHETAMINE SACCHARATE, AMPHETAMINE ASPARTATE, DEXTROAMPHETAMINE SULFATE AND AMPHETAMINE SULFATE 7.5; 7.5; 7.5; 7.5 MG/1; MG/1; MG/1; MG/1
60 TABLET ORAL 2 TIMES DAILY
Qty: 120 TABLET | Refills: 0 | Status: SHIPPED | OUTPATIENT
Start: 2024-06-28

## 2024-06-28 RX ORDER — DEXTROAMPHETAMINE SACCHARATE, AMPHETAMINE ASPARTATE, DEXTROAMPHETAMINE SULFATE AND AMPHETAMINE SULFATE 7.5; 7.5; 7.5; 7.5 MG/1; MG/1; MG/1; MG/1
60 TABLET ORAL 2 TIMES DAILY
Qty: 120 TABLET | Refills: 0 | OUTPATIENT
Start: 2024-06-28

## 2024-06-28 NOTE — TELEPHONE ENCOUNTER
TRAVIS SAYS HE IS OUT OF TOWN.   HE HAS NONE LEFT AFTER TODAY.   PLEASE FILL ASAP.    [id number] - [place] - [address] [facility] [phone] [facility] [phone] FX

## 2024-06-28 NOTE — TELEPHONE ENCOUNTER
Spoke with Jodi, wife on verbal release, I let her know the medication has been sent to the pharmacy in [place].

## 2024-06-28 NOTE — TELEPHONE ENCOUNTER
Patient called stating he needs his adderall filled. He's out of town. Please call pt at [phone]

## 2024-07-23 ENCOUNTER — TELEPHONE (OUTPATIENT)
Age: 39
End: 2024-07-23
Payer: COMMERCIAL

## 2024-07-23 NOTE — TELEPHONE ENCOUNTER
"Relay     \"Your appointment with Dominic Garcia on 09/11/24 had been canceled as Dominic will be out of the office. Your appointment will need to be rescheduled with Paulina Smith NP. Requested return call to reschedule.HUB- ok to schedule next available with Paulina Smith.  \"                 "

## 2024-08-12 DIAGNOSIS — N52.9 ERECTILE DYSFUNCTION, UNSPECIFIED ERECTILE DYSFUNCTION TYPE: ICD-10-CM

## 2024-08-12 DIAGNOSIS — F41.1 GAD (GENERALIZED ANXIETY DISORDER): ICD-10-CM

## 2024-08-12 DIAGNOSIS — F33.8 OTHER RECURRENT DEPRESSIVE DISORDERS: ICD-10-CM

## 2024-08-12 NOTE — TELEPHONE ENCOUNTER
Rx Refill Note  Requested Prescriptions     Pending Prescriptions Disp Refills    tadalafil (Cialis) 20 MG tablet 10 tablet 3     Sig: Take 1 tablet by mouth Daily As Needed for Erectile Dysfunction.      Last office visit with prescribing clinician: 12/21/2023   Last telemedicine visit with prescribing clinician: Visit date not found   Next office visit with prescribing clinician: Visit date not found     Celia Hightower MA  08/12/24, 15:14 EDT

## 2024-08-13 RX ORDER — TADALAFIL 20 MG/1
20 TABLET ORAL DAILY PRN
Qty: 10 TABLET | Refills: 3 | Status: SHIPPED | OUTPATIENT
Start: 2024-08-13

## 2024-08-13 NOTE — TELEPHONE ENCOUNTER
Rx Refill Note  Requested Prescriptions     Pending Prescriptions Disp Refills    Effexor  MG 24 hr capsule 60 capsule 4     Sig: Take 1 capsule by mouth 2 (Two) Times a Day.      Last office visit with prescribing clinician: 3/7/2024   Last telemedicine visit with prescribing clinician: Visit date not found   Next office visit with prescribing clinician: Visit date not found     Please advise when pt needs to follow up    Hilda Alvarez MA  08/13/24, 09:32 EDT

## 2024-08-13 NOTE — TELEPHONE ENCOUNTER
PATIENT WILL BE COMPLETELY OUT OF THE MEDICATION AFTER TODAY 8/13/24    PLEASE NOTIFY PATIENT WHEN THIS PRESCRIPTION HAS BEEN CALLED IN OR IF THERE ARE ANY QUESTIONS/CONCERNS.

## 2024-08-13 NOTE — TELEPHONE ENCOUNTER
Rx Refill Note  Requested Prescriptions     Pending Prescriptions Disp Refills    Effexor  MG 24 hr capsule 60 capsule 4     Sig: Take 1 capsule by mouth 2 (Two) Times a Day.      Last office visit with prescribing clinician: 3/7/2024   Last telemedicine visit with prescribing clinician: Visit date not found   Next office visit with prescribing clinician: Visit date not found       Sherley Wilson MA  08/13/24, 17:24 EDT

## 2024-08-25 DIAGNOSIS — F90.0 ATTENTION DEFICIT HYPERACTIVITY DISORDER (ADHD), PREDOMINANTLY INATTENTIVE TYPE: ICD-10-CM

## 2024-08-26 RX ORDER — DEXTROAMPHETAMINE SACCHARATE, AMPHETAMINE ASPARTATE, DEXTROAMPHETAMINE SULFATE AND AMPHETAMINE SULFATE 7.5; 7.5; 7.5; 7.5 MG/1; MG/1; MG/1; MG/1
60 TABLET ORAL 2 TIMES DAILY
Qty: 120 TABLET | Refills: 0 | Status: SHIPPED | OUTPATIENT
Start: 2024-08-26

## 2024-08-26 NOTE — TELEPHONE ENCOUNTER
Name: Bobo Bolden    Relationship: Self        HUB PROVIDED THE RELAY MESSAGE FROM THE OFFICE   PATIENT VOICED UNDERSTANDING AND HAS NO FURTHER QUESTIONS AT THIS TIME    ADDITIONAL INFORMATION:  PATIENT MADE AN APPOINTMENT FOR 09/04/2024 HE WOULD ALSO LIKE TO MAKE SURE THE MEDICATION IS SENT TO THE [facility] IN [place]

## 2024-08-26 NOTE — TELEPHONE ENCOUNTER
Rx Refill Note  Requested Prescriptions     Pending Prescriptions Disp Refills    amphetamine-dextroamphetamine (ADDERALL) 30 MG tablet 120 tablet 0     Sig: Take 2 tablets by mouth 2 (Two) Times a Day.      Last office visit with prescribing clinician: 3/7/2024   Last telemedicine visit with prescribing clinician: Visit date not found   Next office visit with prescribing clinician: 9/4/2024

## 2024-08-26 NOTE — TELEPHONE ENCOUNTER
Refill request amphetamine   Last refill 6/28/24  Last visit 3/7/24  Attempted to contact pt, lft vm with cb number     OK FOR HUB TO RELAY AND SCHEDULE APPT

## 2024-09-13 ENCOUNTER — OFFICE VISIT (OUTPATIENT)
Dept: INTERNAL MEDICINE | Facility: CLINIC | Age: 39
End: 2024-09-13
Payer: COMMERCIAL

## 2024-09-13 VITALS
HEART RATE: 89 BPM | DIASTOLIC BLOOD PRESSURE: 90 MMHG | BODY MASS INDEX: 43.98 KG/M2 | HEIGHT: 70 IN | SYSTOLIC BLOOD PRESSURE: 138 MMHG | OXYGEN SATURATION: 99 % | TEMPERATURE: 96.4 F | WEIGHT: 307.2 LBS

## 2024-09-13 DIAGNOSIS — N52.9 ERECTILE DISORDER: ICD-10-CM

## 2024-09-13 DIAGNOSIS — I10 HYPERTENSION, UNSPECIFIED TYPE: Primary | ICD-10-CM

## 2024-09-13 DIAGNOSIS — R35.0 URINARY FREQUENCY: ICD-10-CM

## 2024-09-13 DIAGNOSIS — F90.0 ATTENTION DEFICIT HYPERACTIVITY DISORDER (ADHD), PREDOMINANTLY INATTENTIVE TYPE: ICD-10-CM

## 2024-09-13 DIAGNOSIS — F41.1 GAD (GENERALIZED ANXIETY DISORDER): ICD-10-CM

## 2024-09-13 RX ORDER — OLMESARTAN MEDOXOMIL 20 MG/1
20 TABLET ORAL DAILY
Qty: 90 TABLET | Refills: 3 | Status: SHIPPED | OUTPATIENT
Start: 2024-09-13

## 2024-09-26 DIAGNOSIS — F90.0 ATTENTION DEFICIT HYPERACTIVITY DISORDER (ADHD), PREDOMINANTLY INATTENTIVE TYPE: ICD-10-CM

## 2024-09-26 RX ORDER — DEXTROAMPHETAMINE SACCHARATE, AMPHETAMINE ASPARTATE, DEXTROAMPHETAMINE SULFATE AND AMPHETAMINE SULFATE 7.5; 7.5; 7.5; 7.5 MG/1; MG/1; MG/1; MG/1
60 TABLET ORAL 2 TIMES DAILY
Qty: 120 TABLET | Refills: 0 | OUTPATIENT
Start: 2024-09-26

## 2024-09-26 RX ORDER — DEXTROAMPHETAMINE SACCHARATE, AMPHETAMINE ASPARTATE, DEXTROAMPHETAMINE SULFATE AND AMPHETAMINE SULFATE 7.5; 7.5; 7.5; 7.5 MG/1; MG/1; MG/1; MG/1
60 TABLET ORAL 2 TIMES DAILY
Qty: 120 TABLET | Refills: 0 | Status: SHIPPED | OUTPATIENT
Start: 2024-09-26

## 2024-10-25 DIAGNOSIS — F90.0 ATTENTION DEFICIT HYPERACTIVITY DISORDER (ADHD), PREDOMINANTLY INATTENTIVE TYPE: ICD-10-CM

## 2024-10-25 RX ORDER — DEXTROAMPHETAMINE SACCHARATE, AMPHETAMINE ASPARTATE, DEXTROAMPHETAMINE SULFATE AND AMPHETAMINE SULFATE 7.5; 7.5; 7.5; 7.5 MG/1; MG/1; MG/1; MG/1
60 TABLET ORAL 2 TIMES DAILY
Qty: 120 TABLET | Refills: 0 | OUTPATIENT
Start: 2024-10-25

## 2024-10-25 RX ORDER — DEXTROAMPHETAMINE SACCHARATE, AMPHETAMINE ASPARTATE, DEXTROAMPHETAMINE SULFATE AND AMPHETAMINE SULFATE 7.5; 7.5; 7.5; 7.5 MG/1; MG/1; MG/1; MG/1
60 TABLET ORAL 2 TIMES DAILY
Qty: 120 TABLET | Refills: 0 | Status: SHIPPED | OUTPATIENT
Start: 2024-10-25 | End: 2024-10-28 | Stop reason: SDUPTHER

## 2024-10-28 ENCOUNTER — TELEPHONE (OUTPATIENT)
Dept: INTERNAL MEDICINE | Facility: CLINIC | Age: 39
End: 2024-10-28
Payer: COMMERCIAL

## 2024-10-28 DIAGNOSIS — F90.0 ATTENTION DEFICIT HYPERACTIVITY DISORDER (ADHD), PREDOMINANTLY INATTENTIVE TYPE: ICD-10-CM

## 2024-10-28 RX ORDER — DEXTROAMPHETAMINE SACCHARATE, AMPHETAMINE ASPARTATE, DEXTROAMPHETAMINE SULFATE AND AMPHETAMINE SULFATE 7.5; 7.5; 7.5; 7.5 MG/1; MG/1; MG/1; MG/1
60 TABLET ORAL 2 TIMES DAILY
Qty: 120 TABLET | Refills: 0 | Status: SHIPPED | OUTPATIENT
Start: 2024-10-28

## 2024-10-28 NOTE — TELEPHONE ENCOUNTER
Caller: FRANCISCO CHAIDEZ    Relationship: Emergency Contact    Best call back number: [phone]     Which medication are you concerned about:     amphetamine-dextroamphetamine (ADDERALL) 30 MG tablet       Who prescribed you this medication: DR MARTINEZ    What are your concerns: PT IS OUT OF TOWN AND ASKED TO HAVE MEDICATION SENT TO A DIFFERENT PHARMACY. PLEASE SEND THE PRESCRIPTION TO [id number] - [place] - [address] [facility] [place] - [phone] [place] [phone]  [phone]

## 2024-10-28 NOTE — TELEPHONE ENCOUNTER
Spoke with Jodi and let her know medication was sent to the requested pharmacy.  I could not reach the patient phone just rings.

## 2024-11-26 DIAGNOSIS — N52.9 ERECTILE DYSFUNCTION, UNSPECIFIED ERECTILE DYSFUNCTION TYPE: ICD-10-CM

## 2024-11-26 RX ORDER — TADALAFIL 20 MG/1
20 TABLET ORAL DAILY PRN
Qty: 10 TABLET | Refills: 2 | Status: SHIPPED | OUTPATIENT
Start: 2024-11-26

## 2024-11-26 NOTE — TELEPHONE ENCOUNTER
Rx Refill Note  Requested Prescriptions     Pending Prescriptions Disp Refills    tadalafil (CIALIS) 20 MG tablet [Pharmacy Med Name: TADALAFIL 20 MG TABLET] 10 tablet 2     Sig: TAKE 1 TABLET BY MOUTH DAILY AS NEEDED FOR ERECTILE DYSFUNCTION.      Last office visit with prescribing clinician: 12/21/2023   Last telemedicine visit with prescribing clinician: Visit date not found   Next office visit with prescribing clinician: 1/2/2025       Viktoriya Vilchis  11/26/24, 11:33 EST

## 2024-12-07 NOTE — TELEPHONE ENCOUNTER
ICU update note    Blood noted in OG tube.  Bright red appearing.  We had recently increased his sedation to help with agitated delirium.  Noted to be hypotensive now.  SBP got as low as 60 mmHg.  Will give 500 mL LR.  Decrease propofol and start peripheral levophed.  GI consult.  Already on IV PPI BID.  Keep OG tube to LIS.     Check CBC q 6 hours.     Critical care time 20 minutes bringing total time for day up to 65 minutes.     Sivakumar Flores MD   Patient is in [place] and wants to know if the adderall can be sent to a [facility] there.  Pharmacy is in the chart.

## 2024-12-08 DIAGNOSIS — F90.0 ATTENTION DEFICIT HYPERACTIVITY DISORDER (ADHD), PREDOMINANTLY INATTENTIVE TYPE: ICD-10-CM

## 2024-12-08 DIAGNOSIS — J45.20 MILD INTERMITTENT ASTHMA, UNSPECIFIED WHETHER COMPLICATED: ICD-10-CM

## 2024-12-09 NOTE — TELEPHONE ENCOUNTER
REFILL REQUEST ALBUTEROL   LAST REFILL     AMPHETAMINE DEXTROAMPHETAMINE   LAST REFILL 10/28/24  LAST VISIT 10/28/24  NEXT VISIT 12/13/24

## 2024-12-11 DIAGNOSIS — J45.20 MILD INTERMITTENT ASTHMA, UNSPECIFIED WHETHER COMPLICATED: ICD-10-CM

## 2024-12-11 RX ORDER — DEXTROAMPHETAMINE SACCHARATE, AMPHETAMINE ASPARTATE, DEXTROAMPHETAMINE SULFATE AND AMPHETAMINE SULFATE 7.5; 7.5; 7.5; 7.5 MG/1; MG/1; MG/1; MG/1
60 TABLET ORAL 2 TIMES DAILY
Qty: 120 TABLET | Refills: 0 | Status: SHIPPED | OUTPATIENT
Start: 2024-12-11

## 2025-01-14 DIAGNOSIS — F90.0 ATTENTION DEFICIT HYPERACTIVITY DISORDER (ADHD), PREDOMINANTLY INATTENTIVE TYPE: ICD-10-CM

## 2025-01-14 NOTE — TELEPHONE ENCOUNTER
Rx Refill Note  Requested Prescriptions     Pending Prescriptions Disp Refills    amphetamine-dextroamphetamine (ADDERALL) 30 MG tablet 120 tablet 0     Sig: Take 2 tablets by mouth 2 (Two) Times a Day.      Last office visit with prescribing clinician: 9/13/2024   Last telemedicine visit with prescribing clinician: Visit date not found   Next office visit with prescribing clinician: Visit date not found   Last Filled:12/11/24    Hilda Alvarez MA  01/14/25, 15:22 EST

## 2025-01-15 RX ORDER — DEXTROAMPHETAMINE SACCHARATE, AMPHETAMINE ASPARTATE, DEXTROAMPHETAMINE SULFATE AND AMPHETAMINE SULFATE 7.5; 7.5; 7.5; 7.5 MG/1; MG/1; MG/1; MG/1
60 TABLET ORAL 2 TIMES DAILY
Qty: 120 TABLET | Refills: 0 | Status: SHIPPED | OUTPATIENT
Start: 2025-01-15

## 2025-01-27 DIAGNOSIS — N52.9 ERECTILE DYSFUNCTION, UNSPECIFIED ERECTILE DYSFUNCTION TYPE: ICD-10-CM

## 2025-01-27 RX ORDER — TADALAFIL 20 MG/1
20 TABLET ORAL DAILY PRN
Qty: 10 TABLET | Refills: 2 | Status: SHIPPED | OUTPATIENT
Start: 2025-01-27

## 2025-01-27 NOTE — TELEPHONE ENCOUNTER
Rx Refill Note  Requested Prescriptions     Pending Prescriptions Disp Refills    tadalafil (CIALIS) 20 MG tablet 10 tablet 2     Sig: Take 1 tablet by mouth Daily As Needed for Erectile Dysfunction.      Last office visit with prescribing clinician: 12/21/2023   Last telemedicine visit with prescribing clinician: Visit date not found   Next office visit with prescribing clinician: Visit date not found     Shahzad Mooney  01/27/25, 09:20 EST

## 2025-02-05 ENCOUNTER — TELEPHONE (OUTPATIENT)
Dept: UROLOGY | Facility: CLINIC | Age: 40
End: 2025-02-05
Payer: COMMERCIAL

## 2025-02-05 DIAGNOSIS — Z31.41 FERTILITY TESTING: Primary | ICD-10-CM

## 2025-02-05 NOTE — TELEPHONE ENCOUNTER
Pt came into office to  a cup for the sample, didn't see the order in his chart, can we go ahead and put that in and give the pt a call when that is done, thank you

## 2025-02-12 ENCOUNTER — LAB (OUTPATIENT)
Dept: LAB | Facility: HOSPITAL | Age: 40
End: 2025-02-12
Payer: COMMERCIAL

## 2025-02-12 DIAGNOSIS — Z31.41 FERTILITY TESTING: ICD-10-CM

## 2025-02-12 LAB
CHARACTER SMN: NORMAL
COLOR SMN: ABNORMAL
LIQUEFACTION TIME SMN: NORMAL MIN
PH SMN: 8 [PH]
SPECIMEN VOL SMN: 3.1 ML (ref 2–5)
SPERM # SMN: 24.2 MILLIONS/ML
SPERM MORPHOLOGY COMMENT: ABNORMAL
SPERM MOTILE NFR SMN: 40 % MOTILE (ref 50–100)
VISC SMN QL: NORMAL

## 2025-02-12 PROCEDURE — 89320 SEMEN ANAL VOL/COUNT/MOT: CPT

## 2025-02-18 DIAGNOSIS — Z31.41 FERTILITY TESTING: Primary | ICD-10-CM

## 2025-02-18 DIAGNOSIS — F90.0 ATTENTION DEFICIT HYPERACTIVITY DISORDER (ADHD), PREDOMINANTLY INATTENTIVE TYPE: ICD-10-CM

## 2025-02-19 RX ORDER — DEXTROAMPHETAMINE SACCHARATE, AMPHETAMINE ASPARTATE, DEXTROAMPHETAMINE SULFATE AND AMPHETAMINE SULFATE 7.5; 7.5; 7.5; 7.5 MG/1; MG/1; MG/1; MG/1
60 TABLET ORAL 2 TIMES DAILY
Qty: 120 TABLET | Refills: 0 | OUTPATIENT
Start: 2025-02-19

## 2025-02-19 NOTE — TELEPHONE ENCOUNTER
Needs appointment.  Left message on voicemail patient is due for an appointment.  He was last seen in September 2024 and cancelled his appointment on 12/13/24.  Left message on voicemail asking patient to schedule an appointment for the adderall refill.    HUB please scheduled next available appointment.

## 2025-02-20 DIAGNOSIS — N52.9 ERECTILE DYSFUNCTION, UNSPECIFIED ERECTILE DYSFUNCTION TYPE: ICD-10-CM

## 2025-02-20 RX ORDER — TADALAFIL 20 MG/1
20 TABLET ORAL DAILY PRN
Qty: 10 TABLET | Refills: 2 | Status: SHIPPED | OUTPATIENT
Start: 2025-02-20

## 2025-02-24 DIAGNOSIS — F90.0 ATTENTION DEFICIT HYPERACTIVITY DISORDER (ADHD), PREDOMINANTLY INATTENTIVE TYPE: ICD-10-CM

## 2025-02-24 RX ORDER — DEXTROAMPHETAMINE SACCHARATE, AMPHETAMINE ASPARTATE, DEXTROAMPHETAMINE SULFATE AND AMPHETAMINE SULFATE 7.5; 7.5; 7.5; 7.5 MG/1; MG/1; MG/1; MG/1
60 TABLET ORAL 2 TIMES DAILY
Qty: 120 TABLET | Refills: 0 | Status: SHIPPED | OUTPATIENT
Start: 2025-02-24

## 2025-02-24 NOTE — TELEPHONE ENCOUNTER
Patient was last seen in September and scheduled an appointment for 12/13/24 and canceled.  Can you send in enough until his appointment.

## 2025-02-24 NOTE — TELEPHONE ENCOUNTER
Left message on voicemail letting patient know medication has been sent to the pharmacy.  I asked patient to please keep his upcoming appointment.

## 2025-02-24 NOTE — TELEPHONE ENCOUNTER
Caller: Bobo Bolden    Relationship: Self    Best call back number:  [phone] (Home)     Requested Prescriptions:   Requested Prescriptions      No prescriptions requested or ordered in this encounter    amphetamine-dextroamphetamine (ADDERALL) 30 MG tablet     Pharmacy where request should be sent: [facility] [place], [place] - [address] - [phone] PH - [phone] FX     Last office visit with prescribing clinician: 9/13/2024   Last telemedicine visit with prescribing clinician: Visit date not found   Next office visit with prescribing clinician: 2/26/2025     Additional details provided by patient:  PATIENT IS OUT OF THE MEDICATION AND HAS AN APPOINTMENT SCHEDULED ON 2-26-25 TO SEE DR ALEJANDRA   REQUESTING ENOUGH MEDICATION TO GET HIM THROUGH UNTIL THEN     Does the patient have less than a 3 day supply:  [x] Yes  [] No    Would you like a call back once the refill request has been completed: [] Yes [x] No    If the office needs to give you a call back, can they leave a voicemail: [] Yes [x] No       no

## 2025-02-26 ENCOUNTER — OFFICE VISIT (OUTPATIENT)
Dept: INTERNAL MEDICINE | Facility: CLINIC | Age: 40
End: 2025-02-26
Payer: COMMERCIAL

## 2025-02-26 VITALS
HEIGHT: 70 IN | TEMPERATURE: 97.5 F | OXYGEN SATURATION: 97 % | DIASTOLIC BLOOD PRESSURE: 86 MMHG | SYSTOLIC BLOOD PRESSURE: 142 MMHG | BODY MASS INDEX: 43.29 KG/M2 | WEIGHT: 302.4 LBS | HEART RATE: 98 BPM

## 2025-02-26 DIAGNOSIS — I10 HYPERTENSION, UNSPECIFIED TYPE: ICD-10-CM

## 2025-02-26 DIAGNOSIS — R79.89 ELEVATED TSH: ICD-10-CM

## 2025-02-26 DIAGNOSIS — F90.0 ATTENTION DEFICIT HYPERACTIVITY DISORDER (ADHD), PREDOMINANTLY INATTENTIVE TYPE: Primary | ICD-10-CM

## 2025-02-26 DIAGNOSIS — E78.1 HYPERTRIGLYCERIDEMIA: ICD-10-CM

## 2025-02-26 DIAGNOSIS — F41.1 GAD (GENERALIZED ANXIETY DISORDER): ICD-10-CM

## 2025-02-26 DIAGNOSIS — R73.03 PREDIABETES: ICD-10-CM

## 2025-02-26 PROCEDURE — 99214 OFFICE O/P EST MOD 30 MIN: CPT | Performed by: STUDENT IN AN ORGANIZED HEALTH CARE EDUCATION/TRAINING PROGRAM

## 2025-02-26 NOTE — PROGRESS NOTES
Office Note     Name: Bobo Bolden    : 1985     MRN: [id number]     Chief Complaint  ADHD and Hypertension    Subjective     History of Present Illness:  Bobo Bolden is a 39 y.o. male who presents today for     Follow up for ADHD med management,   Taking Adderall 60mg BID. He has been on this dose for period of time    HTN:   Blood pressure has been running high, he does not check regularly at home and has not been taking the blood pressure meds at home.     Anxiety: on effexor 150mg  Feels less anxious when on medication. Has been on it for 15 years.       Review of Systems:   Review of Systems   All other systems reviewed and are negative.      Past Medical History:   Past Medical History:   Diagnosis Date    ADHD (attention deficit hyperactivity disorder)     Allergic     Anxiety     Asthma     Depression     GERD (gastroesophageal reflux disease)     Substance abuse        Past Surgical History:   Past Surgical History:   Procedure Laterality Date    ADENOIDECTOMY  2006    TONSILLECTOMY  2006       Family History:   Family History   Problem Relation Age of Onset    Hyperlipidemia Mother     Miscarriages / Stillbirths Mother     Early death Father     Hyperlipidemia Father     Alcohol abuse Maternal Grandfather     Asthma Maternal Uncle        Social History:   Social History     Socioeconomic History    Marital status:    Tobacco Use    Smoking status: Never     Passive exposure: Past    Smokeless tobacco: Never   Vaping Use    Vaping status: Every Day    Substances: Nicotine    Devices: Refillable tank    Passive vaping exposure: Yes   Substance and Sexual Activity    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 Currently     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     Sexual activity: Yes     Partners: Female       Immunizations:   Immunization History   Administered Date(s) Administered    COVID-19 (PFIZER) 12YRS+ (COMIRNATY) 2024    COVID-19  "(PFIZER) Purple Cap Monovalent 03/20/2021, 04/10/2021    Covid-19 (Pfizer) Gray Cap Monovalent 02/12/2022    Fluzone  >6mos 11/27/2016, 09/13/2024    Fluzone (or Fluarix & Flulaval for VFC) >6mos 11/09/2023    Hep A, Unspecified 02/12/2019    Influenza, Unspecified 10/25/2017, 02/12/2019    PPD Test 08/24/2016, 08/31/2016, 10/25/2017, 02/12/2019    Tdap 08/24/2016        Medications:     Current Outpatient Medications:     albuterol (PROAIR RESPICLICK) 108 (90 Base) MCG/ACT inhaler, Inhale 2 puffs Every 4 (Four) Hours As Needed for Wheezing., Disp: 1 each, Rfl: 5    amphetamine-dextroamphetamine (ADDERALL) 30 MG tablet, Take 2 tablets by mouth 2 (Two) Times a Day., Disp: 120 tablet, Rfl: 0    Effexor  MG 24 hr capsule, Take 1 capsule by mouth 2 (Two) Times a Day., Disp: 180 capsule, Rfl: 2    esomeprazole (NexIUM) 20 MG packet, Take 20 mg by mouth Every Morning Before Breakfast., Disp: , Rfl:     fluticasone (FLONASE) 50 MCG/ACT nasal spray, Administer 2 sprays into the nostril(s) as directed by provider Daily., Disp: , Rfl:     memantine (NAMENDA) 10 MG tablet, Take 1 tablet by mouth Daily., Disp: 30 tablet, Rfl: 5    olmesartan (Benicar) 20 MG tablet, Take 1 tablet by mouth Daily., Disp: 90 tablet, Rfl: 3    tadalafil (CIALIS) 20 MG tablet, Take 1 tablet by mouth Daily As Needed for Erectile Dysfunction., Disp: 10 tablet, Rfl: 2    Allergies:   No Known Allergies    Objective     Vital Signs  /86   Pulse 98   Temp 97.5 °F (36.4 °C) (Temporal)   Ht 178.7 cm (70.35\")   Wt (!) 137 kg (302 lb 6.4 oz)   SpO2 97%   BMI 42.95 kg/m²   Estimated body mass index is 42.95 kg/m² as calculated from the following:    Height as of this encounter: 178.7 cm (70.35\").    Weight as of this encounter: 137 kg (302 lb 6.4 oz).          Physical Exam  Constitutional:       Appearance: Normal appearance. He is obese.   Cardiovascular:      Rate and Rhythm: Normal rate and regular rhythm.      Pulses: Normal pulses.      " Heart sounds: Normal heart sounds.   Pulmonary:      Effort: Pulmonary effort is normal.      Breath sounds: Normal breath sounds.   Neurological:      Mental Status: He is alert.          Result Review :     Common labs          5/17/2024    08:38   Common Labs   Glucose 92    BUN 8    Creatinine 0.93    Sodium 139    Potassium 4.1    Chloride 101    Calcium 9.7    Albumin 4.6    Total Bilirubin 0.4    Alkaline Phosphatase 58    AST (SGOT) 26    ALT (SGPT) 36    WBC 6.52    Hemoglobin 14.3    Hematocrit 43.6    Platelets 233    Total Cholesterol 195    Triglycerides 246    HDL Cholesterol 37    LDL Cholesterol  115    Hemoglobin A1C 6.20      CMP          5/17/2024    08:38   CMP   Glucose 92    BUN 8    Creatinine 0.93    EGFR 109.1    Sodium 139    Potassium 4.1    Chloride 101    Calcium 9.7    Total Protein 7.2    Albumin 4.6    Globulin 2.6    Total Bilirubin 0.4    Alkaline Phosphatase 58    AST (SGOT) 26    ALT (SGPT) 36    Albumin/Globulin Ratio 1.8    BUN/Creatinine Ratio 8.6    Anion Gap 10.1      CBC          5/17/2024    08:38   CBC   WBC 6.52    RBC 5.35    Hemoglobin 14.3    Hematocrit 43.6    MCV 81.5    MCH 26.7    MCHC 32.8    RDW 13.3    Platelets 233      CBC w/diff          5/17/2024    08:38   CBC w/Diff   WBC 6.52    RBC 5.35    Hemoglobin 14.3    Hematocrit 43.6    MCV 81.5    MCH 26.7    MCHC 32.8    RDW 13.3    Platelets 233      Lipid Panel          5/17/2024    08:38   Lipid Panel   Total Cholesterol 195    Triglycerides 246    HDL Cholesterol 37    VLDL Cholesterol 43    LDL Cholesterol  115    LDL/HDL Ratio 2.94      TSH          5/17/2024    08:38   TSH   TSH 4.220                 Assessment and Plan     1. Hypertension, unspecified type  Continue with Benicar    - Basic Metabolic Panel    2. VLADIMIR (generalized anxiety disorder)  Stable, continue with effexor    3. Attention deficit hyperactivity disorder (ADHD), predominantly inattentive type  Stable, continue with Adderall 60 mg twice  daily  [place] reviewed  Follow up in 3 months,    4. Prediabetes  last A1c 6.2 recheck A1c next visit  - Hemoglobin A1c; Future    5. Elevated TSH  - TSH; Future    6. Hypertriglyceridemia   I would recommend diet and lifestyle modifications which include avoidance of foods that are high in fat, sugar, and carbohydrates.  Focus on trying to eat leafy green vegetables, foods that are high in fiber, and lean meats.  Patient should also try to get at least 150 minutes of cardiovascular exercise weekly.    - Lipid Panel; Future       Follow Up  No follow-ups on file.    MD ALEC Orozco PC [address]  [facility]  [address]  [address] [address] [phone]

## 2025-03-04 DIAGNOSIS — R86.9 ABNORMAL SEMEN ANALYSIS: ICD-10-CM

## 2025-03-04 DIAGNOSIS — Z31.41 FERTILITY TESTING: Primary | ICD-10-CM

## 2025-03-05 ENCOUNTER — LAB (OUTPATIENT)
Dept: LAB | Facility: HOSPITAL | Age: 40
End: 2025-03-05
Payer: COMMERCIAL

## 2025-03-05 DIAGNOSIS — Z31.41 FERTILITY TESTING: ICD-10-CM

## 2025-03-05 LAB
CHARACTER SMN: NORMAL
COLOR SMN: ABNORMAL
LIQUEFACTION TIME SMN: NORMAL MIN
PH SMN: 8 [PH]
SPECIMEN VOL SMN: 4.5 ML (ref 2–5)
SPERM # SMN: 13.5 MILLIONS/ML
SPERM MORPHOLOGY COMMENT: ABNORMAL
SPERM MOTILE NFR SMN: 40 % MOTILE (ref 50–100)
VISC SMN QL: NORMAL

## 2025-03-05 PROCEDURE — 89320 SEMEN ANAL VOL/COUNT/MOT: CPT

## 2025-03-18 DIAGNOSIS — N46.9 INFERTILITY MALE: Primary | ICD-10-CM

## 2025-03-24 DIAGNOSIS — F90.0 ATTENTION DEFICIT HYPERACTIVITY DISORDER (ADHD), PREDOMINANTLY INATTENTIVE TYPE: ICD-10-CM

## 2025-03-25 NOTE — TELEPHONE ENCOUNTER
Rx Refill Note  Requested Prescriptions     Pending Prescriptions Disp Refills    amphetamine-dextroamphetamine (ADDERALL) 30 MG tablet 120 tablet 0     Sig: Take 2 tablets by mouth 2 (Two) Times a Day.      Last office visit with prescribing clinician: 2/26/2025   Last telemedicine visit with prescribing clinician: Visit date not found   Next office visit with prescribing clinician: 5/19/2025   Last Filled:2/24/25      Hilda Alvarez MA  03/25/25, 14:33 EDT

## 2025-03-26 ENCOUNTER — TELEPHONE (OUTPATIENT)
Dept: INTERNAL MEDICINE | Facility: CLINIC | Age: 40
End: 2025-03-26
Payer: COMMERCIAL

## 2025-03-26 RX ORDER — DEXTROAMPHETAMINE SACCHARATE, AMPHETAMINE ASPARTATE, DEXTROAMPHETAMINE SULFATE AND AMPHETAMINE SULFATE 7.5; 7.5; 7.5; 7.5 MG/1; MG/1; MG/1; MG/1
60 TABLET ORAL 2 TIMES DAILY
Qty: 120 TABLET | Refills: 0 | Status: SHIPPED | OUTPATIENT
Start: 2025-03-26

## 2025-03-26 NOTE — TELEPHONE ENCOUNTER
Caller: Bobo Bolden    Relationship: Self    Best call back number: [phone]     Requested Prescriptions:   Requested Prescriptions     Pending Prescriptions Disp Refills    amphetamine-dextroamphetamine (ADDERALL) 30 MG tablet 120 tablet 0     Sig: Take 2 tablets by mouth 2 (Two) Times a Day.        Pharmacy where request should be sent: [facility] [place], [place] - [address] [place] [address] LN - [phone] PH - [phone] FX     Last office visit with prescribing clinician: 2/26/2025   Last telemedicine visit with prescribing clinician: Visit date not found   Next office visit with prescribing clinician: 5/19/2025     Additional details provided by patient: PATIENT IS OUT    Does the patient have less than a 3 day supply:  [x] Yes  [] No    Would you like a call back once the refill request has been completed: [] Yes [x] No    If the office needs to give you a call back, can they leave a voicemail: [] Yes [x] No    Dary Harper Rep   03/26/25 09:59 EDT

## 2025-03-27 ENCOUNTER — LAB (OUTPATIENT)
Dept: LAB | Facility: HOSPITAL | Age: 40
End: 2025-03-27
Payer: COMMERCIAL

## 2025-03-27 ENCOUNTER — PRIOR AUTHORIZATION (OUTPATIENT)
Dept: INTERNAL MEDICINE | Facility: CLINIC | Age: 40
End: 2025-03-27
Payer: COMMERCIAL

## 2025-03-27 DIAGNOSIS — E78.1 HYPERTRIGLYCERIDEMIA: ICD-10-CM

## 2025-03-27 DIAGNOSIS — R79.89 ELEVATED TSH: ICD-10-CM

## 2025-03-27 DIAGNOSIS — R73.03 PREDIABETES: ICD-10-CM

## 2025-03-27 DIAGNOSIS — N46.9 INFERTILITY MALE: ICD-10-CM

## 2025-03-27 LAB
ANION GAP SERPL CALCULATED.3IONS-SCNC: 15.7 MMOL/L (ref 5–15)
BUN SERPL-MCNC: 9 MG/DL (ref 6–20)
BUN/CREAT SERPL: 9.6 (ref 7–25)
CALCIUM SPEC-SCNC: 9.5 MG/DL (ref 8.6–10.5)
CHLORIDE SERPL-SCNC: 103 MMOL/L (ref 98–107)
CHOLEST SERPL-MCNC: 193 MG/DL (ref 0–200)
CO2 SERPL-SCNC: 26.3 MMOL/L (ref 22–29)
CREAT SERPL-MCNC: 0.94 MG/DL (ref 0.76–1.27)
EGFRCR SERPLBLD CKD-EPI 2021: 107.1 ML/MIN/1.73
ESTRADIOL SERPL HS-MCNC: 16.6 PG/ML
FSH SERPL-ACNC: 4.67 MIU/ML
GLUCOSE SERPL-MCNC: 105 MG/DL (ref 65–99)
HBA1C MFR BLD: 6.3 % (ref 4.8–5.6)
HDLC SERPL-MCNC: 27 MG/DL (ref 40–60)
LDLC SERPL CALC-MCNC: 95 MG/DL (ref 0–100)
LDLC/HDLC SERPL: 3.01 {RATIO}
LH SERPL-ACNC: 5.2 MIU/ML
POTASSIUM SERPL-SCNC: 3.6 MMOL/L (ref 3.5–5.2)
PROLACTIN SERPL-MCNC: 10.1 NG/ML (ref 4.04–15.2)
SODIUM SERPL-SCNC: 145 MMOL/L (ref 136–145)
TRIGL SERPL-MCNC: 423 MG/DL (ref 0–150)
TSH SERPL DL<=0.05 MIU/L-ACNC: 3.61 UIU/ML (ref 0.27–4.2)
VLDLC SERPL-MCNC: 71 MG/DL (ref 5–40)

## 2025-03-27 PROCEDURE — 84146 ASSAY OF PROLACTIN: CPT

## 2025-03-27 PROCEDURE — 80061 LIPID PANEL: CPT

## 2025-03-27 PROCEDURE — 83002 ASSAY OF GONADOTROPIN (LH): CPT

## 2025-03-27 PROCEDURE — 84402 ASSAY OF FREE TESTOSTERONE: CPT

## 2025-03-27 PROCEDURE — 84443 ASSAY THYROID STIM HORMONE: CPT

## 2025-03-27 PROCEDURE — 83036 HEMOGLOBIN GLYCOSYLATED A1C: CPT

## 2025-03-27 PROCEDURE — 83001 ASSAY OF GONADOTROPIN (FSH): CPT

## 2025-03-27 PROCEDURE — 80048 BASIC METABOLIC PNL TOTAL CA: CPT | Performed by: STUDENT IN AN ORGANIZED HEALTH CARE EDUCATION/TRAINING PROGRAM

## 2025-03-27 PROCEDURE — 36415 COLL VENOUS BLD VENIPUNCTURE: CPT

## 2025-03-27 PROCEDURE — 84403 ASSAY OF TOTAL TESTOSTERONE: CPT

## 2025-03-27 PROCEDURE — 82670 ASSAY OF TOTAL ESTRADIOL: CPT

## 2025-03-27 NOTE — TELEPHONE ENCOUNTER
Your prior authorization was successfully sent to the prescriber on:    Wednesday, March 26th at 03:04pm

## 2025-04-02 LAB
TESTOST FREE MFR SERPL: 3.1 % (ref 1.5–4.2)
TESTOST FREE SERPL-MCNC: 9.21 NG/DL (ref 5–21)
TESTOST SERPL-MCNC: 297 NG/DL (ref 264–916)

## 2025-04-18 DIAGNOSIS — F90.0 ATTENTION DEFICIT HYPERACTIVITY DISORDER (ADHD), PREDOMINANTLY INATTENTIVE TYPE: ICD-10-CM

## 2025-04-18 RX ORDER — DEXTROAMPHETAMINE SACCHARATE, AMPHETAMINE ASPARTATE, DEXTROAMPHETAMINE SULFATE AND AMPHETAMINE SULFATE 7.5; 7.5; 7.5; 7.5 MG/1; MG/1; MG/1; MG/1
60 TABLET ORAL 2 TIMES DAILY
Qty: 120 TABLET | Refills: 0 | Status: SHIPPED | OUTPATIENT
Start: 2025-04-18

## 2025-04-18 NOTE — TELEPHONE ENCOUNTER
LV: 2/26/25  NV: 5/19/25  LF: 3/26/25 120/0  CSC: no current one on file  UDS: no current one on file

## 2025-04-29 RX ORDER — ALBUTEROL SULFATE 90 UG/1
2 INHALANT RESPIRATORY (INHALATION) EVERY 4 HOURS PRN
Qty: 18 G | Refills: 4 | Status: SHIPPED | OUTPATIENT
Start: 2025-04-29

## 2025-05-11 DIAGNOSIS — F41.1 GAD (GENERALIZED ANXIETY DISORDER): ICD-10-CM

## 2025-05-11 DIAGNOSIS — F33.8 OTHER RECURRENT DEPRESSIVE DISORDERS: ICD-10-CM

## 2025-05-12 DIAGNOSIS — F33.8 OTHER RECURRENT DEPRESSIVE DISORDERS: ICD-10-CM

## 2025-05-12 DIAGNOSIS — I10 HYPERTENSION, UNSPECIFIED TYPE: ICD-10-CM

## 2025-05-12 DIAGNOSIS — F41.1 GAD (GENERALIZED ANXIETY DISORDER): ICD-10-CM

## 2025-05-12 RX ORDER — OLMESARTAN MEDOXOMIL 20 MG/1
20 TABLET ORAL DAILY
Qty: 90 TABLET | Refills: 3 | Status: SHIPPED | OUTPATIENT
Start: 2025-05-12

## 2025-05-12 RX ORDER — VENLAFAXINE HCL 150 MG
150 CAPSULE, EXT RELEASE 24 HR ORAL 2 TIMES DAILY
Qty: 180 CAPSULE | Refills: 2 | Status: SHIPPED | OUTPATIENT
Start: 2025-05-12

## 2025-05-12 NOTE — TELEPHONE ENCOUNTER
PATIENT HAS CALLED TO LET OFFICE KNOW HE WILL BE OUT OF MEDICATIONS TODAY.     Recommendations from today's MTM visit:                                                      1. Try packing your pillbox in your backpack to help you remember to take your second dose of furosemide every day. This should help with your weight fluctuations.    Next MTM visit: as needed    To schedule another MTM appointment, please call the clinic directly or you may call the MTM scheduling line at [phone] or toll-free at [phone].     My Clinical Pharmacist's contact information:                                                      It was a pleasure speaking with you today!  Please feel free to contact me with any questions or concerns you have.      Zahira Ramirez, Jim  Medication Therapy Management Resident  Pager: [phone]    You may receive a survey about the MTM services you received.  I would appreciate your feedback to help me serve you better in the future. Please fill it out and return it when you can. Your comments will be anonymous.

## 2025-05-12 NOTE — TELEPHONE ENCOUNTER
Rx Refill Note  Requested Prescriptions     Pending Prescriptions Disp Refills    Effexor  MG 24 hr capsule 180 capsule 2     Sig: Take 1 capsule by mouth 2 (Two) Times a Day.    olmesartan (Benicar) 20 MG tablet 90 tablet 3     Sig: Take 1 tablet by mouth Daily.      Last office visit with prescribing clinician: 2/26/2025   Last telemedicine visit with prescribing clinician: Visit date not found   Next office visit with prescribing clinician: 5/19/2025     Rani Hickey MA  05/12/25, 16:59 EDT

## 2025-05-13 RX ORDER — VENLAFAXINE HYDROCHLORIDE 150 MG/1
150 CAPSULE, EXTENDED RELEASE ORAL EVERY 12 HOURS SCHEDULED
Qty: 30 CAPSULE | Refills: 17 | OUTPATIENT
Start: 2025-05-13

## 2025-05-19 ENCOUNTER — OFFICE VISIT (OUTPATIENT)
Dept: INTERNAL MEDICINE | Facility: CLINIC | Age: 40
End: 2025-05-19
Payer: COMMERCIAL

## 2025-05-19 VITALS
TEMPERATURE: 97.3 F | SYSTOLIC BLOOD PRESSURE: 122 MMHG | WEIGHT: 302.2 LBS | HEART RATE: 93 BPM | OXYGEN SATURATION: 98 % | HEIGHT: 70 IN | DIASTOLIC BLOOD PRESSURE: 70 MMHG | BODY MASS INDEX: 43.26 KG/M2

## 2025-05-19 DIAGNOSIS — E78.1 HYPERTRIGLYCERIDEMIA: Primary | ICD-10-CM

## 2025-05-19 DIAGNOSIS — I10 HYPERTENSION, UNSPECIFIED TYPE: ICD-10-CM

## 2025-05-19 DIAGNOSIS — F90.0 ATTENTION DEFICIT HYPERACTIVITY DISORDER (ADHD), PREDOMINANTLY INATTENTIVE TYPE: ICD-10-CM

## 2025-05-19 DIAGNOSIS — N52.9 ERECTILE DYSFUNCTION, UNSPECIFIED ERECTILE DYSFUNCTION TYPE: ICD-10-CM

## 2025-05-19 PROCEDURE — 99214 OFFICE O/P EST MOD 30 MIN: CPT | Performed by: STUDENT IN AN ORGANIZED HEALTH CARE EDUCATION/TRAINING PROGRAM

## 2025-05-19 RX ORDER — DEXTROAMPHETAMINE SACCHARATE, AMPHETAMINE ASPARTATE, DEXTROAMPHETAMINE SULFATE AND AMPHETAMINE SULFATE 7.5; 7.5; 7.5; 7.5 MG/1; MG/1; MG/1; MG/1
60 TABLET ORAL 2 TIMES DAILY
Qty: 120 TABLET | Refills: 0 | Status: SHIPPED | OUTPATIENT
Start: 2025-05-19

## 2025-05-19 RX ORDER — TRIAMCINOLONE ACETONIDE 1 MG/G
0.5 CREAM TOPICAL DAILY
COMMUNITY
Start: 2025-05-11

## 2025-05-19 RX ORDER — TADALAFIL 20 MG/1
20 TABLET ORAL DAILY PRN
Qty: 10 TABLET | Refills: 2 | Status: SHIPPED | OUTPATIENT
Start: 2025-05-19

## 2025-05-19 NOTE — PROGRESS NOTES
Office Note     Name: Bobo Bolden    : 1985     MRN: [id number]     Chief Complaint  ADHD and Hypertension    Subjective     History of Present Illness:  Bobo Bolden is a 39 y.o. male who presents today for     ADHD Med management  Current medication Adderall 60mg BID  Helps with focus, energy, he is less anxios as he is not worried about the symptoms affecting his work  He has tried in the past Concerta, and vyvanse in the past when he lived in [place]      Hypertension:  he is on Benicar 20mg daily, denies s/e    ED: He follows up with urology, he has been put on Tadalafil to help with his symptom. Help with initiation and maintaining erections.       Review of Systems:   Review of Systems   All other systems reviewed and are negative.      Past Medical History:   Past Medical History:   Diagnosis Date    ADHD (attention deficit hyperactivity disorder)     Allergic     Anxiety     Asthma     Depression     GERD (gastroesophageal reflux disease)     Substance abuse        Past Surgical History:   Past Surgical History:   Procedure Laterality Date    ADENOIDECTOMY  2006    TONSILLECTOMY  2006       Family History:   Family History   Problem Relation Age of Onset    Hyperlipidemia Mother     Miscarriages / Stillbirths Mother     Early death Father     Hyperlipidemia Father     Alcohol abuse Maternal Grandfather     Asthma Maternal Uncle        Social History:   Social History     Socioeconomic History    Marital status:    Tobacco Use    Smoking status: Never     Passive exposure: Past    Smokeless tobacco: Never   Vaping Use    Vaping status: Every Day    Substances: Nicotine    Devices: Refillable tank    Passive vaping exposure: Yes   Substance and Sexual Activity    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 Currently     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     Sexual activity: Yes     Partners: Female       Immunizations:   Immunization History  "  Administered Date(s) Administered    COVID-19 (PFIZER) 12YRS+ (COMIRNATY) 09/13/2024    COVID-19 (PFIZER) Purple Cap Monovalent 03/20/2021, 04/10/2021    Covid-19 (Pfizer) Gray Cap Monovalent 02/12/2022    Fluzone  >6mos 11/27/2016, 09/13/2024    Fluzone (or Fluarix & Flulaval for VFC) >6mos 11/09/2023    Hep A, Unspecified 02/12/2019    Influenza, Unspecified 10/25/2017, 02/12/2019    PPD Test 08/24/2016, 08/31/2016, 10/25/2017, 02/12/2019    Tdap 08/24/2016        Medications:     Current Outpatient Medications:     albuterol sulfate  (90 Base) MCG/ACT inhaler, INHALE 2 PUFFS EVERY 4 HOURS AS NEEDED FOR WHEEZING, Disp: 18 g, Rfl: 4    amphetamine-dextroamphetamine (ADDERALL) 30 MG tablet, Take 2 tablets by mouth 2 (Two) Times a Day., Disp: 120 tablet, Rfl: 0    Clomid 50 MG tablet, Take 0.5 tablets by mouth Daily., Disp: , Rfl:     Effexor  MG 24 hr capsule, Take 1 capsule by mouth 2 (Two) Times a Day., Disp: 180 capsule, Rfl: 2    esomeprazole (NexIUM) 20 MG packet, Take 20 mg by mouth Every Morning Before Breakfast., Disp: , Rfl:     fluticasone (FLONASE) 50 MCG/ACT nasal spray, Administer 2 sprays into the nostril(s) as directed by provider Daily., Disp: , Rfl:     memantine (NAMENDA) 10 MG tablet, Take 1 tablet by mouth Daily., Disp: 30 tablet, Rfl: 5    olmesartan (Benicar) 20 MG tablet, Take 1 tablet by mouth Daily., Disp: 90 tablet, Rfl: 3    tadalafil (CIALIS) 20 MG tablet, Take 1 tablet by mouth Daily As Needed for Erectile Dysfunction., Disp: 10 tablet, Rfl: 2    albuterol (PROAIR RESPICLICK) 108 (90 Base) MCG/ACT inhaler, Inhale 2 puffs Every 4 (Four) Hours As Needed for Wheezing. (Patient not taking: Reported on 5/19/2025), Disp: 1 each, Rfl: 5    Allergies:   No Known Allergies    Objective     Vital Signs  /70   Pulse 93   Temp 97.3 °F (36.3 °C) (Temporal)   Ht 178.7 cm (70.35\")   Wt (!) 137 kg (302 lb 3.2 oz)   SpO2 98%   BMI 42.93 kg/m²   Estimated body mass index is " "42.93 kg/m² as calculated from the following:    Height as of this encounter: 178.7 cm (70.35\").    Weight as of this encounter: 137 kg (302 lb 3.2 oz).    Class 3 Severe Obesity (BMI >=40). Obesity-related health conditions include the following: hypertension. Obesity is unchanged. BMI is is above average; BMI management plan is completed. We discussed low calorie, low carb based diet program, portion control, and increasing exercise.      Physical Exam  Constitutional:       Appearance: Normal appearance. He is obese.   Cardiovascular:      Rate and Rhythm: Normal rate and regular rhythm.      Pulses: Normal pulses.      Heart sounds: Normal heart sounds.   Pulmonary:      Effort: Pulmonary effort is normal.      Breath sounds: Normal breath sounds.   Neurological:      Mental Status: He is alert.          Result Review :                  Assessment and Plan     1. Attention deficit hyperactivity disorder (ADHD), predominantly inattentive type  Symptoms controlled  Continue with Adderall 60mg BID daily  He has tried Concerta, Vyvanse in the past  - amphetamine-dextroamphetamine (ADDERALL) 30 MG tablet; Take 2 tablets by mouth 2 (Two) Times a Day.  Dispense: 120 tablet; Refill: 0  - Compliance Drug Analysis, Ur - Urine, Clean Catch; Future    2. Erectile dysfunction, unspecified erectile dysfunction type  Medication refilled  - tadalafil (CIALIS) 20 MG tablet; Take 1 tablet by mouth Daily As Needed for Erectile Dysfunction.  Dispense: 10 tablet; Refill: 2    3. Hypertriglyceridemia  I would recommend diet and lifestyle modifications which include avoidance of foods that are high in fat, sugar, and carbohydrates.  Focus on trying to eat leafy green vegetables, foods that are high in fiber, and lean meats.  Patient should also try to get at least 150 minutes of cardiovascular exercise weekly.      4. Hypertension, unspecified type  Controlled on Benicar 20mg daily       Follow Up  Return in about 3 months (around " 8/19/2025) for Recheck, ADD.    MD VELIA OrozcoE PC [address]  [facility]  [address]  [address] [address] [phone]

## 2025-05-27 LAB — DRUGS UR: NORMAL

## 2025-06-17 DIAGNOSIS — F90.0 ATTENTION DEFICIT HYPERACTIVITY DISORDER (ADHD), PREDOMINANTLY INATTENTIVE TYPE: ICD-10-CM

## 2025-06-18 DIAGNOSIS — Z31.41 FERTILITY TESTING: Primary | ICD-10-CM

## 2025-06-19 ENCOUNTER — LAB (OUTPATIENT)
Dept: LAB | Facility: HOSPITAL | Age: 40
End: 2025-06-19
Payer: COMMERCIAL

## 2025-06-19 DIAGNOSIS — Z31.41 FERTILITY TESTING: ICD-10-CM

## 2025-06-19 LAB — TESTOST SERPL-MCNC: 947 NG/DL (ref 249–836)

## 2025-06-19 PROCEDURE — 36415 COLL VENOUS BLD VENIPUNCTURE: CPT

## 2025-06-19 PROCEDURE — 84403 ASSAY OF TOTAL TESTOSTERONE: CPT

## 2025-06-20 RX ORDER — DEXTROAMPHETAMINE SACCHARATE, AMPHETAMINE ASPARTATE, DEXTROAMPHETAMINE SULFATE AND AMPHETAMINE SULFATE 7.5; 7.5; 7.5; 7.5 MG/1; MG/1; MG/1; MG/1
60 TABLET ORAL 2 TIMES DAILY
Qty: 120 TABLET | Refills: 0 | Status: SHIPPED | OUTPATIENT
Start: 2025-06-20

## 2025-06-20 NOTE — TELEPHONE ENCOUNTER
Rx Refill Note  Requested Prescriptions     Pending Prescriptions Disp Refills    amphetamine-dextroamphetamine (ADDERALL) 30 MG tablet 120 tablet 0     Sig: Take 2 tablets by mouth 2 (Two) Times a Day.      Last office visit with prescribing clinician: 5/19/2025   Last telemedicine visit with prescribing clinician: Visit date not found   Next office visit with prescribing clinician: 8/20/2025   LAST FILLED:5/19/25      Hilda Alvarez MA  06/20/25, 16:46 EDT

## 2025-07-10 ENCOUNTER — LAB (OUTPATIENT)
Dept: LAB | Facility: HOSPITAL | Age: 40
End: 2025-07-10
Payer: COMMERCIAL

## 2025-07-10 DIAGNOSIS — Z31.41 FERTILITY TESTING: ICD-10-CM

## 2025-07-10 LAB
CHARACTER SMN: ABNORMAL
COLOR SMN: ABNORMAL
LIQUEFACTION TIME SMN: NORMAL MIN
PH SMN: 8 [PH]
SPECIMEN VOL SMN: 3.1 ML (ref 2–5)
SPERM # SMN: 3 MILLIONS/ML
SPERM MORPHOLOGY COMMENT: ABNORMAL
SPERM MOTILE NFR SMN: 35 % MOTILE (ref 50–100)
VISC SMN QL: NORMAL

## 2025-07-10 PROCEDURE — 89320 SEMEN ANAL VOL/COUNT/MOT: CPT

## 2025-07-22 DIAGNOSIS — F90.0 ATTENTION DEFICIT HYPERACTIVITY DISORDER (ADHD), PREDOMINANTLY INATTENTIVE TYPE: ICD-10-CM

## 2025-07-22 DIAGNOSIS — N52.9 ERECTILE DYSFUNCTION, UNSPECIFIED ERECTILE DYSFUNCTION TYPE: ICD-10-CM

## 2025-07-23 RX ORDER — DEXTROAMPHETAMINE SACCHARATE, AMPHETAMINE ASPARTATE, DEXTROAMPHETAMINE SULFATE AND AMPHETAMINE SULFATE 7.5; 7.5; 7.5; 7.5 MG/1; MG/1; MG/1; MG/1
60 TABLET ORAL 2 TIMES DAILY
Qty: 120 TABLET | Refills: 0 | Status: SHIPPED | OUTPATIENT
Start: 2025-07-23

## 2025-07-24 NOTE — TELEPHONE ENCOUNTER
Rx Refill Note  Requested Prescriptions     Pending Prescriptions Disp Refills    tadalafil (CIALIS) 20 MG tablet 10 tablet 2     Sig: Take 1 tablet by mouth Daily As Needed for Erectile Dysfunction.      Last office visit with prescribing clinician: 5/19/2025   Last telemedicine visit with prescribing clinician: Visit date not found   Next office visit with prescribing clinician: 7/31/2025       Rani Hickey MA  07/24/25, 10:37 EDT

## 2025-07-25 RX ORDER — TADALAFIL 20 MG/1
20 TABLET ORAL DAILY PRN
Qty: 10 TABLET | Refills: 2 | Status: SHIPPED | OUTPATIENT
Start: 2025-07-25

## 2025-07-31 ENCOUNTER — OFFICE VISIT (OUTPATIENT)
Dept: INTERNAL MEDICINE | Facility: CLINIC | Age: 40
End: 2025-07-31
Payer: COMMERCIAL

## 2025-07-31 VITALS
DIASTOLIC BLOOD PRESSURE: 66 MMHG | BODY MASS INDEX: 42.58 KG/M2 | HEART RATE: 85 BPM | WEIGHT: 297.4 LBS | HEIGHT: 70 IN | TEMPERATURE: 96.2 F | SYSTOLIC BLOOD PRESSURE: 126 MMHG | OXYGEN SATURATION: 97 %

## 2025-07-31 DIAGNOSIS — F41.1 GAD (GENERALIZED ANXIETY DISORDER): ICD-10-CM

## 2025-07-31 DIAGNOSIS — F90.0 ATTENTION DEFICIT HYPERACTIVITY DISORDER (ADHD), PREDOMINANTLY INATTENTIVE TYPE: ICD-10-CM

## 2025-07-31 DIAGNOSIS — I10 HYPERTENSION, UNSPECIFIED TYPE: ICD-10-CM

## 2025-07-31 DIAGNOSIS — M79.89 LEG SWELLING: ICD-10-CM

## 2025-07-31 PROCEDURE — 99214 OFFICE O/P EST MOD 30 MIN: CPT | Performed by: STUDENT IN AN ORGANIZED HEALTH CARE EDUCATION/TRAINING PROGRAM

## 2025-07-31 RX ORDER — HYDROCHLOROTHIAZIDE 12.5 MG/1
12.5 TABLET ORAL DAILY
Qty: 90 TABLET | Refills: 1 | Status: SHIPPED | OUTPATIENT
Start: 2025-07-31

## 2025-07-31 NOTE — PROGRESS NOTES
Office Note     Name: Bobo Bolden    : 1985     MRN: [id number]     Chief Complaint  Leg Swelling (BOTH LEGS MORE SWELLING ON LFT LEG )    Subjective     History of Present Illness:  Bobo Bolden is a 39 y.o. male who presents today for     Hypertension: on benicar , reports doing well, pressure have been fine, did notice he has bilateral leg swelling these past few months, his socks leave an indent.     Anxiety on effexor    Seeing fertility specialists for low sperm.    Review of Systems:   Review of Systems    Past Medical History:   Past Medical History:   Diagnosis Date    ADHD (attention deficit hyperactivity disorder)     Allergic     Anxiety     Asthma     Depression     GERD (gastroesophageal reflux disease)     Substance abuse        Past Surgical History:   Past Surgical History:   Procedure Laterality Date    ADENOIDECTOMY  2006    TONSILLECTOMY  2006       Family History:   Family History   Problem Relation Age of Onset    Hyperlipidemia Mother     Miscarriages / Stillbirths Mother     Early death Father     Hyperlipidemia Father     Alcohol abuse Maternal Grandfather     Asthma Maternal Uncle        Social History:   Social History     Socioeconomic History    Marital status:    Tobacco Use    Smoking status: Never     Passive exposure: Past    Smokeless tobacco: Never   Vaping Use    Vaping status: Every Day    Substances: Nicotine    Devices: Refillable tank    Passive vaping exposure: Yes   Substance and Sexual Activity    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 Currently     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     Sexual activity: Yes     Partners: Female       Immunizations:   Immunization History   Administered Date(s) Administered    COVID-19 (PFIZER) 12YRS+ (COMIRNATY) 2024    COVID-19 (PFIZER) Purple Cap Monovalent 2021, 04/10/2021    Covid-19 (Pfizer) Gray Cap Monovalent 2022    Fluzone  >6mos 2016,  "09/13/2024    Fluzone (or Fluarix & Flulaval for VFC) >6mos 11/09/2023    Hep A, Unspecified 02/12/2019    Influenza, Unspecified 10/25/2017, 02/12/2019    PPD Test 08/24/2016, 08/31/2016, 10/25/2017, 02/12/2019    Tdap 08/24/2016        Medications:     Current Outpatient Medications:     albuterol sulfate  (90 Base) MCG/ACT inhaler, INHALE 2 PUFFS EVERY 4 HOURS AS NEEDED FOR WHEEZING, Disp: 18 g, Rfl: 4    amphetamine-dextroamphetamine (ADDERALL) 30 MG tablet, Take 2 tablets by mouth 2 (Two) Times a Day., Disp: 120 tablet, Rfl: 0    Clomid 50 MG tablet, Take 0.5 tablets by mouth Daily., Disp: , Rfl:     Effexor  MG 24 hr capsule, Take 1 capsule by mouth 2 (Two) Times a Day., Disp: 180 capsule, Rfl: 2    esomeprazole (NexIUM) 20 MG packet, Take 20 mg by mouth Every Morning Before Breakfast., Disp: , Rfl:     fluticasone (FLONASE) 50 MCG/ACT nasal spray, Administer 2 sprays into the nostril(s) as directed by provider Daily., Disp: , Rfl:     memantine (NAMENDA) 10 MG tablet, Take 1 tablet by mouth Daily., Disp: 30 tablet, Rfl: 5    olmesartan (Benicar) 20 MG tablet, Take 1 tablet by mouth Daily., Disp: 90 tablet, Rfl: 3    tadalafil (CIALIS) 20 MG tablet, Take 1 tablet by mouth Daily As Needed for Erectile Dysfunction., Disp: 10 tablet, Rfl: 2    albuterol (PROAIR RESPICLICK) 108 (90 Base) MCG/ACT inhaler, Inhale 2 puffs Every 4 (Four) Hours As Needed for Wheezing. (Patient not taking: Reported on 7/31/2025), Disp: 1 each, Rfl: 5    hydroCHLOROthiazide 12.5 MG tablet, Take 1 tablet by mouth Daily., Disp: 90 tablet, Rfl: 1    Allergies:   No Known Allergies    Objective     Vital Signs  /66   Pulse 85   Temp 96.2 °F (35.7 °C) (Infrared)   Ht 178.7 cm (70.35\")   Wt 135 kg (297 lb 6.4 oz)   SpO2 97%   BMI 42.24 kg/m²   Estimated body mass index is 42.24 kg/m² as calculated from the following:    Height as of this encounter: 178.7 cm (70.35\").    Weight as of this encounter: 135 kg (297 lb 6.4 " oz).            Physical Exam  Constitutional:       Appearance: Normal appearance.   Cardiovascular:      Rate and Rhythm: Normal rate and regular rhythm.      Pulses: Normal pulses.      Heart sounds: Normal heart sounds.   Pulmonary:      Effort: Pulmonary effort is normal.      Breath sounds: Normal breath sounds.   Musculoskeletal:         General: Swelling present.      Right lower leg: Edema present.      Left lower leg: Edema present.   Neurological:      Mental Status: He is alert.          Result Review :                  Assessment and Plan     1. Hypertension, unspecified type  Controlled on Benicar  Will add HCTZ 12.5 mg daily to blood pressure help with leg swelling  - hydroCHLOROthiazide 12.5 MG tablet; Take 1 tablet by mouth Daily.  Dispense: 90 tablet; Refill: 1    2. Leg swelling  Likely chronic venous stasis  Discussed leg elevation, compression stockings, monitor diet    - hydroCHLOROthiazide 12.5 MG tablet; Take 1 tablet by mouth Daily.  Dispense: 90 tablet; Refill: 1  - Venous w Reflux Lower Extremity - Bilateral CAR; Future    3. VLADIMIR (generalized anxiety disorder)  Stable on Effexor  Continue with medication    4. Attention deficit hyperactivity disorder (ADHD), predominantly inattentive type  Symptoms controlled  Continue with Adderall 60mg BID daily  Okay to refill until next appointment       Follow Up  Return in about 3 months (around 10/31/2025) for Recheck.    Fernando Blanton MD  [facility]  [facility]  [address]  [address] [address] [phone]

## 2025-08-19 DIAGNOSIS — F90.0 ATTENTION DEFICIT HYPERACTIVITY DISORDER (ADHD), PREDOMINANTLY INATTENTIVE TYPE: ICD-10-CM

## 2025-08-20 RX ORDER — DEXTROAMPHETAMINE SACCHARATE, AMPHETAMINE ASPARTATE, DEXTROAMPHETAMINE SULFATE AND AMPHETAMINE SULFATE 7.5; 7.5; 7.5; 7.5 MG/1; MG/1; MG/1; MG/1
60 TABLET ORAL 2 TIMES DAILY
Qty: 120 TABLET | Refills: 0 | Status: SHIPPED | OUTPATIENT
Start: 2025-08-20